# Patient Record
Sex: FEMALE | Race: WHITE | Employment: FULL TIME | URBAN - METROPOLITAN AREA
[De-identification: names, ages, dates, MRNs, and addresses within clinical notes are randomized per-mention and may not be internally consistent; named-entity substitution may affect disease eponyms.]

---

## 2018-07-05 ENCOUNTER — OFFICE VISIT (OUTPATIENT)
Dept: OBGYN CLINIC | Age: 43
End: 2018-07-05

## 2018-07-05 VITALS
BODY MASS INDEX: 32.91 KG/M2 | SYSTOLIC BLOOD PRESSURE: 124 MMHG | WEIGHT: 243 LBS | DIASTOLIC BLOOD PRESSURE: 68 MMHG | HEIGHT: 72 IN

## 2018-07-05 DIAGNOSIS — Z01.419 ENCOUNTER FOR GYNECOLOGICAL EXAMINATION (GENERAL) (ROUTINE) WITHOUT ABNORMAL FINDINGS: Primary | ICD-10-CM

## 2018-07-05 NOTE — PROGRESS NOTES
Annual exam ages 40-58    Javier Andres is a ,  44 y.o. female [facility] Patient's last menstrual period was 2018 (approximate). .    She presents for her annual checkup. She is having no significant problems. With regard to the Gardasil vaccine, she is older than the age for which it is FDA approved. Menstrual status:    Her periods are light, moderate in flow. She is using one to two pads or tampons per day, usually regular and last 26-30 days. She denies dysmenorrhea. She reports no premenstrual symptoms. Contraception:    The current method of family planning is vasectomy. Sexual history:    She  reports that she currently engages in sexual activity and has had male partners. She reports using the following method of birth control/protection: Surgical.    Medical conditions:    Since her last annual GYN exam about three or more years ago, she has not the following changes in her health history: none. Pap and Mammogram History:    Her most recent Pap smear was normal, obtained 5 year(s) ago. The patient has not had a recent mammogram.    Breast Cancer History/Substance Abuse: negative    Osteoporosis History:    Family history does not include a first or second degree relative with osteopenia or osteoporosis. A bone density scan has not been obtained    Past Medical History:   Diagnosis Date    Anxiety     Condyloma     H/O seasonal allergies     Migraines     Trichomonal vulvovaginitis     Uterine polyp      Past Surgical History:   Procedure Laterality Date    HX  SECTION      HX DILATION AND CURETTAGE      HX HYSTEROSCOPY WITH ENDOMETRIAL ABLATION      HX PELVIC LAPAROSCOPY         Current Outpatient Prescriptions   Medication Sig Dispense Refill    HYDROcodone-acetaminophen (LORTAB 7.5) 7.5-500 mg per tablet Take 1 Tab by mouth every four (4) hours as needed for Pain.  16 Tab 0    escitalopram (LEXAPRO) 10 mg tablet Take 10 mg by mouth daily.      clonazePAM (KLONOPIN) 1 mg tablet Take 1 mg by mouth two (2) times daily as needed. Allergies: Wellbutrin [bupropion hcl]     Tobacco History:  reports that she has been smoking. She has a 20.00 pack-year smoking history. She has never used smokeless tobacco.  Alcohol Abuse:  reports that she does not drink alcohol. Drug Abuse:  reports that she does not use illicit drugs.     Family Medical/Cancer History:   Family History   Problem Relation Age of Onset    Nural tube defect Child         Review of Systems - History obtained from the patient  Constitutional: negative for weight loss, fever, night sweats  HEENT: negative for hearing loss, earache, congestion, snoring, sorethroat  CV: negative for chest pain, palpitations, edema  Resp: negative for cough, shortness of breath, wheezing  GI: negative for change in bowel habits, abdominal pain, black or bloody stools  : negative for frequency, dysuria, hematuria, vaginal discharge  MSK: negative for back pain, joint pain, muscle pain  Breast: negative for breast lumps, nipple discharge, galactorrhea  Skin :negative for itching, rash, hives  Neuro: negative for dizziness, headache, confusion, weakness  Psych: negative for anxiety, depression, change in mood  Heme/lymph: negative for bleeding, bruising, pallor    Physical Exam    Visit Vitals    /68    Ht 6' (1.829 m)    Wt 243 lb (110.2 kg)    LMP 06/13/2018 (Approximate)    BMI 32.96 kg/m2       Constitutional  · Appearance: well-nourished, well developed, alert, in no acute distress    HENT  · Head and Face: appears normal    Neck  · Inspection/Palpation: normal appearance, no masses or tenderness  · Lymph Nodes: no lymphadenopathy present  · Thyroid: gland size normal, nontender, no nodules or masses present on palpation    Chest  · Respiratory Effort: breathing unlabored  · Auscultation: normal breath sounds    Cardiovascular  · Heart:  · Auscultation: regular rate and rhythm without murmur    Breasts  · Inspection of Breasts: breasts symmetrical, no skin changes, no discharge present, nipple appearance normal, no skin retraction present  · Palpation of Breasts and Axillae: no masses present on palpation, no breast tenderness  · Axillary Lymph Nodes: no lymphadenopathy present    Gastrointestinal  · Abdominal Examination: abdomen non-tender to palpation, normal bowel sounds, no masses present  · Liver and spleen: no hepatomegaly present, spleen not palpable  · Hernias: no hernias identified    Genitourinary  · External Genitalia: normal appearance for age, no discharge present, no tenderness present, no inflammatory lesions present, no masses present, no atrophy present  · Vagina: normal vaginal vault without central or paravaginal defects, no discharge present, no inflammatory lesions present, no masses present  · Bladder: non-tender to palpation  · Urethra: appears normal  · Cervix: normal   · Uterus: normal size, shape and consistency  · Adnexa: no adnexal tenderness present, no adnexal masses present  · Perineum: perineum within normal limits, no evidence of trauma, no rashes or skin lesions present  · Anus: anus within normal limits, no hemorrhoids present  · Inguinal Lymph Nodes: no lymphadenopathy present    Skin  · General Inspection: no rash, no lesions identified    Neurologic/Psychiatric  · Mental Status:  · Orientation: grossly oriented to person, place and time  · Mood and Affect: mood normal, affect appropriate    Assessment:  Routine gynecologic examination  Her current medical status is satisfactory with no evidence of significant gynecologic issues.     Plan:  Counseled re: diet, exercise, healthy lifestyle  Return for yearly wellness visits  Rec annual mammogram

## 2018-07-05 NOTE — PATIENT INSTRUCTIONS

## 2018-07-09 LAB
CYTOLOGIST CVX/VAG CYTO: NORMAL
CYTOLOGY CVX/VAG DOC THIN PREP: NORMAL
CYTOLOGY HISTORY:: NORMAL
DX ICD CODE: NORMAL
HPV I/H RISK 1 DNA CVX QL PROBE+SIG AMP: NEGATIVE
Lab: NORMAL
OTHER STN SPEC: NORMAL
PATH REPORT.FINAL DX SPEC: NORMAL
STAT OF ADQ CVX/VAG CYTO-IMP: NORMAL

## 2018-08-03 ENCOUNTER — APPOINTMENT (OUTPATIENT)
Dept: OBGYN CLINIC | Age: 43
End: 2018-08-03

## 2018-11-30 ENCOUNTER — OFFICE VISIT (OUTPATIENT)
Dept: CARDIOLOGY CLINIC | Age: 43
End: 2018-11-30

## 2018-11-30 VITALS
HEIGHT: 72 IN | OXYGEN SATURATION: 97 % | WEIGHT: 293 LBS | DIASTOLIC BLOOD PRESSURE: 80 MMHG | SYSTOLIC BLOOD PRESSURE: 130 MMHG | BODY MASS INDEX: 39.68 KG/M2 | HEART RATE: 86 BPM | RESPIRATION RATE: 16 BRPM

## 2018-11-30 DIAGNOSIS — M79.89 LEG SWELLING: Primary | ICD-10-CM

## 2018-11-30 PROBLEM — E66.01 OBESITY, MORBID (HCC): Status: ACTIVE | Noted: 2018-11-30

## 2018-11-30 RX ORDER — FUROSEMIDE 40 MG/1
40 TABLET ORAL DAILY
Qty: 30 TAB | Refills: 0 | Status: SHIPPED | OUTPATIENT
Start: 2018-11-30 | End: 2019-02-02 | Stop reason: SDUPTHER

## 2018-11-30 RX ORDER — FUROSEMIDE 40 MG/1
TABLET ORAL DAILY
COMMUNITY
End: 2018-11-30 | Stop reason: SDUPTHER

## 2018-11-30 NOTE — PROGRESS NOTES
Reason for Consult: LE swelling. HPI: Amanda Haro is a 44 y.o. female with no past medical history is here for evaluation of symptoms of lower extremity edema. Symptoms started about few years ago. Symptoms has been worse for past few months. She has noticed increasing lower extremity edema. She also has noticed increased scaliness and dryness of the lower extremities. The swelling has been constant with some variation. There is no associated shortness of breath or chest pain. He had lost about 80 pounds 2 years ago and had gained most of all of it in the past 1 year. Apparently the swelling has worsened since her weight gain. No significant family history of heart disease. She does not take any medications. She smokes tobacco.    EKG in our office today demonstrate normal sinus rhythm with normal axis with normal ST segment with normal intervals. Plan: Next    1. Lower extremity edema: This is probably multifactorial likely due to weight gain, immobility, venous insufficiency and poor lymphatic drainage. Advised her to increase her mobility, use compression stockings, I will give her Lasix 40 mg p.o. daily for the next few days, also check echocardiogram to make sure the RV function is normal.  Also reduce salt intake.       Past Medical History:   Diagnosis Date    Anxiety     Condyloma     H/O seasonal allergies     Migraines     Trichomonal vulvovaginitis     Uterine polyp             Past Surgical History:   Procedure Laterality Date    HX  SECTION      HX DILATION AND CURETTAGE      HX HYSTEROSCOPY WITH ENDOMETRIAL ABLATION      HX PELVIC LAPAROSCOPY               Family History   Problem Relation Age of Onset    Nural tube defect Child            Social History     Socioeconomic History    Marital status: SINGLE     Spouse name: Not on file    Number of children: Not on file    Years of education: Not on file    Highest education level: Not on file   Social Needs    Financial resource strain: Not on file    Food insecurity - worry: Not on file    Food insecurity - inability: Not on file    Transportation needs - medical: Not on file   [employer] needs - non-medical: Not on file   Occupational History    Not on file   Tobacco Use    Smoking status: Current Every Day Smoker     Packs/day: 1.00     Years: 20.00     Pack years: 20.00    Smokeless tobacco: Never Used   Substance and Sexual Activity    Alcohol use: No    Drug use: No    Sexual activity: Yes     Partners: Male     Birth control/protection: Surgical     Comment: partner with vasectomy   Other Topics Concern    Not on file   Social History Narrative    Not on file         Allergies   Allergen Reactions    Wellbutrin [Bupropion Hcl] Hives            Current Outpatient Medications   Medication Sig Dispense Refill    HYDROcodone-acetaminophen (LORTAB 7.5) 7.5-500 mg per tablet Take 1 Tab by mouth every four (4) hours as needed for Pain. 16 Tab 0    escitalopram (LEXAPRO) 10 mg tablet Take 10 mg by mouth daily.  clonazePAM (KLONOPIN) 1 mg tablet Take 1 mg by mouth two (2) times daily as needed. ROS:  12 point review of systems was performed.  All negative except for HPI     Physical Exam:  Visit Vitals  /80 (BP 1 Location: Left arm, BP Patient Position: Sitting)   Pulse 86   Resp 16   Ht 6' (1.829 m)   Wt 295 lb (133.8 kg)   SpO2 97%   BMI 40.01 kg/m²       Gen:  Well-developed, well-nourished, in no acute distress  HEENT:  Pink conjunctivae, PERRL, hearing intact to voice, moist mucous membranes  Neck:  Supple, without masses, thyroid non-tender  Resp:  No accessory muscle use, clear breath sounds without wheezes rales or rhonchi  Card:  No murmurs, normal S1, S2 without thrills, bruits or peripheral edema  Abd:  Soft, non-tender, non-distended, normoactive bowel sounds are present, no palpable organomegaly and no detectable hernias  Lymph:  No cervical or inguinal adenopathy  Musc:  No cyanosis or clubbing  Skin:  No rashes or ulcers, skin turgor is good  Neuro:  Cranial nerves are grossly intact, no focal motor weakness, follows commands appropriately  Psych:  Good insight, oriented to person, place and time, alert     Labs:     Lab Results   Component Value Date/Time    WBC 7.2 08/05/2013 10:39 AM    HGB 13.1 08/05/2013 10:39 AM    HCT 37.8 08/05/2013 10:39 AM    PLATELET 550 37/16/9150 10:39 AM    MCV 86.3 08/05/2013 10:39 AM     No results found for: HBA1C, GZX0AVWN, HGBE8, GLU, GESTF, GLUCPOC, MCACR, MCA1, MCA2, MCA3, MCAU, LDL, LDLC, DLDLP, BJ, CREAPOC, ACREA, CREA, REFC3, REFC4, NHU1TCSL   No results found for: CHOL, CHOLPOCT, HDL, LDL, LDLC, LDLCPOC, LDLCEXT, TRIGL, TGLPOCT, CHHD, CHHDX  Lab Results   Component Value Date/Time    PLATELET 350 14/29/0475 10:39 AM     No results found for: INR, PTMR, PTP, PT1, PT2   No results found for: GFRNA, GFRNAPOC, GFRAA, GFRAAPOC, CREA, CREAPOC, BUN, IBUN, BUNPOC, NA, NAPOC, K, KPOCT, CL, CLPOC, CO2, CO2POC, MG, PHOS, ALBEU, PTH, PTHILT, EPO  No results found for: PSA, Thor Stalling, PSA3, PSAR3, [id number], [id number], PSALT  No results found for: TSH, TSH2, TSH3, TSHP, TSHELE, TSHEXT, TT3, T3U, T3UP, FRT3, FT3, FT4, FT4P, T4, T4P, FT4T, TT7, TSHEXT   No results found for: GLU, GLUCPOC   No results found for: CPK, RCK1, RCK2, RCK3, RCK4, CKMB, CKNDX, CKND1, TROPT, TROIQ, BNPP, BNP   No results found for: BNP, BNPP, BNPPPOC, XBNPT, BNPNT   No results found for: NA, K, CL, CO2, AGAP, GLU, BUN, CREA, BUCR, GFRAA, GFRNA, CA, GFRAA   No results found for: NA, K, CL, CO2, AGAP, GLU, BUN, CREA, BUCR, GFRAA, GFRNA, CA, TBIL, TBILI, GPT, ALT, SGOT, AP, TP, ALB, GLOB, AGRAT   No results found for: HBA1C, PEU9XHJR, HGBE8, GAM8BNYM, XFQ0AULP      No results for input(s): CPK, CKMB, TROIQ in the last 72 hours.     No lab exists for component: CKQMB, CPKMB        Problem List:     Problem List  Date Reviewed: 11/30/2018 Codes Class Noted    Obesity, morbid ([facility] [address] ICD-10-CM: E66.01  ICD-9-CM: 278.01  11/30/2018        Menorrhagia ICD-10-CM: N92.0  ICD-9-CM: 626.2  8/11/2013        Endometrial polyp ICD-10-CM: N84.0  ICD-9-CM: 621.0  8/11/2013        Dysmenorrhea ICD-10-CM: N94.6  ICD-9-CM: 625.3  8/11/2013                Clark Baldwin MD, [facility]

## 2019-01-11 ENCOUNTER — CLINICAL SUPPORT (OUTPATIENT)
Dept: CARDIOLOGY CLINIC | Age: 44
End: 2019-01-11

## 2019-01-11 ENCOUNTER — OFFICE VISIT (OUTPATIENT)
Dept: CARDIOLOGY CLINIC | Age: 44
End: 2019-01-11

## 2019-01-11 VITALS
DIASTOLIC BLOOD PRESSURE: 72 MMHG | HEIGHT: 72 IN | SYSTOLIC BLOOD PRESSURE: 130 MMHG | OXYGEN SATURATION: 98 % | HEART RATE: 76 BPM | WEIGHT: 293 LBS | BODY MASS INDEX: 39.68 KG/M2

## 2019-01-11 DIAGNOSIS — M79.89 LEG SWELLING: Primary | ICD-10-CM

## 2019-01-11 DIAGNOSIS — M79.89 LOCALIZED SWELLING OF LOWER EXTREMITY: Primary | ICD-10-CM

## 2019-01-11 NOTE — PROGRESS NOTES
Patient has swelling in legs and feet today       Visit Vitals  /72 (BP 1 Location: Left arm, BP Patient Position: Sitting)   Pulse 76   Ht 6' (1.829 m)   Wt 297 lb 12.8 oz (135.1 kg)   SpO2 98%   BMI 40.39 kg/m²

## 2019-01-11 NOTE — PROGRESS NOTES
Office Follow-up    NAME: Vikash Monroe   :  1975  MRM:  [id number]    Date:  2019            Assessment:     Problem List  Date Reviewed: 2019          Codes Class Noted    Obesity, morbid ([facility] ICD-10-CM: E66.01  ICD-9-CM: 278.01  2018        Menorrhagia ICD-10-CM: N92.0  ICD-9-CM: 626.2  2013        Endometrial polyp ICD-10-CM: N84.0  ICD-9-CM: 621.0  2013        Dysmenorrhea ICD-10-CM: N94.6  ICD-9-CM: 625.3  2013                 Plan:     1. Lower extremity edema: We discussed about multifactorial reasons for lower extremity edema. Some of it is weight gain and immobility along with venous insufficiency and poor lymphatics. Continue compression stockings, leg elevation, increase activity, low-salt diet, and weight loss. Use Lasix as needed. 2. See as needed. Subjective:     Vikash Monroe, a 44y.o. year-old who presents for followup. I had seen her back in 2018. She has bilateral lower extremity edema. At that time we decided to start her on Lasix on a daily basis for a few days and also get an echocardiogram.  She used the Lasix for a few days here and there. Swelling improved. She also use compression stockings as well as leg elevation technique and that helped as well. She has not lost or gained any weight in the last 3 months. Echocardiogram has not been performed however she will will get an echocardiogram today in our office.     Exam:     Physical Exam:  Visit Vitals  /72 (BP 1 Location: Left arm, BP Patient Position: Sitting)   Pulse 76   Ht 6' (1.829 m)   Wt 297 lb 12.8 oz (135.1 kg)   SpO2 98%   BMI 40.39 kg/m²     General appearance - alert, well appearing, and in no distress  Mental status - affect appropriate to mood  Eyes - sclera anicteric, moist mucous membranes  Neck - supple, no significant adenopathy  Chest - clear to auscultation, no wheezes, rales or rhonchi  Heart - normal rate, regular rhythm, normal S1, S2, no murmurs, rubs, clicks or gallops  Abdomen - soft, nontender, nondistended, no masses or organomegaly  Extremities - peripheral pulses normal, 2+ pedal edema  Skin - normal coloration  no rashes    Medications:     Current Outpatient Medications   Medication Sig    furosemide (LASIX) 40 mg tablet Take 1 Tab by mouth daily. (Patient taking differently: Take 40 mg by mouth daily as needed.)    HYDROcodone-acetaminophen (LORTAB 7.5) 7.5-500 mg per tablet Take 1 Tab by mouth every four (4) hours as needed for Pain.  escitalopram (LEXAPRO) 10 mg tablet Take 10 mg by mouth daily.  clonazePAM (KLONOPIN) 1 mg tablet Take 1 mg by mouth two (2) times daily as needed. No current facility-administered medications for this visit. Diagnostic Data Review:       No specialty comments available. Lab Review:   No results found for: CHOL, CHOLX, CHLST, CHOLV, 246307, HDL, LDL, LDLC, DLDLP, TGLX, TRIGL, TRIGP, CHHD, CHHDX  No results found for: BJ, CREAPOC, ACREA, CREA, REFC3, REFC4  No results found for: BUN, BUNPOC, IBUN, MBUNV, BUNV  No results found for: K, KI, PLK, WBK  No results found for: HBA1C, HGBE8, TDE6MPQZ  Lab Results   Component Value Date/Time    HGB 13.1 08/05/2013 10:39 AM     Lab Results   Component Value Date/Time    PLATELET 060 77/40/1775 10:39 AM     No results for input(s): CPK, CKMB, TROIQ in the last 72 hours. No lab exists for component: CKQMB, CPKMB             ___________________________________________________    Nacho Helms MD, [facility]

## 2019-01-22 ENCOUNTER — TELEPHONE (OUTPATIENT)
Dept: CARDIOLOGY CLINIC | Age: 44
End: 2019-01-22

## 2019-01-22 NOTE — TELEPHONE ENCOUNTER
Call placed to discuss echo results with pt per VO of Dr. Jason Carlin. Advised of normal results. Continue current medication regimen. Follow up as instructed. Pt expressed understanding.

## 2019-02-04 RX ORDER — FUROSEMIDE 40 MG/1
TABLET ORAL
Qty: 30 TAB | Refills: 0 | Status: SHIPPED | OUTPATIENT
Start: 2019-02-04 | End: 2020-02-28

## 2020-01-31 ENCOUNTER — APPOINTMENT (OUTPATIENT)
Dept: ULTRASOUND IMAGING | Age: 45
End: 2020-01-31
Attending: STUDENT IN AN ORGANIZED HEALTH CARE EDUCATION/TRAINING PROGRAM
Payer: COMMERCIAL

## 2020-01-31 ENCOUNTER — HOSPITAL ENCOUNTER (EMERGENCY)
Age: 45
Discharge: HOME | End: 2020-01-31
Attending: STUDENT IN AN ORGANIZED HEALTH CARE EDUCATION/TRAINING PROGRAM | Admitting: STUDENT IN AN ORGANIZED HEALTH CARE EDUCATION/TRAINING PROGRAM
Payer: COMMERCIAL

## 2020-01-31 VITALS
WEIGHT: 293 LBS | HEART RATE: 72 BPM | BODY MASS INDEX: 41.02 KG/M2 | SYSTOLIC BLOOD PRESSURE: 116 MMHG | TEMPERATURE: 98.1 F | OXYGEN SATURATION: 92 % | RESPIRATION RATE: 11 BRPM | HEIGHT: 71 IN | DIASTOLIC BLOOD PRESSURE: 74 MMHG

## 2020-01-31 DIAGNOSIS — K80.00 CALCULUS OF GALLBLADDER WITH ACUTE CHOLECYSTITIS WITHOUT OBSTRUCTION: Primary | ICD-10-CM

## 2020-01-31 LAB
ALBUMIN SERPL-MCNC: 3.8 G/DL (ref 3.5–5)
ALBUMIN/GLOB SERPL: 0.9 {RATIO} (ref 1.1–2.2)
ALP SERPL-CCNC: 108 U/L (ref 45–117)
ALT SERPL-CCNC: 31 U/L (ref 12–78)
ANION GAP SERPL CALC-SCNC: 6 MMOL/L (ref 5–15)
AST SERPL-CCNC: 33 U/L (ref 15–37)
ATRIAL RATE: 83 BPM
BASOPHILS # BLD: 0.1 K/UL (ref 0–0.1)
BASOPHILS NFR BLD: 1 % (ref 0–1)
BILIRUB SERPL-MCNC: 0.4 MG/DL (ref 0.2–1)
BUN SERPL-MCNC: 10 MG/DL (ref 6–20)
BUN/CREAT SERPL: 15 (ref 12–20)
CALCIUM SERPL-MCNC: 8.9 MG/DL (ref 8.5–10.1)
CALCULATED P AXIS, ECG09: 58 DEGREES
CALCULATED R AXIS, ECG10: 24 DEGREES
CALCULATED T AXIS, ECG11: 11 DEGREES
CHLORIDE SERPL-SCNC: 102 MMOL/L (ref 97–108)
CO2 SERPL-SCNC: 30 MMOL/L (ref 21–32)
COMMENT, HOLDF: NORMAL
CREAT SERPL-MCNC: 0.68 MG/DL (ref 0.55–1.02)
DIAGNOSIS, 93000: NORMAL
DIFFERENTIAL METHOD BLD: NORMAL
EOSINOPHIL # BLD: 0.2 K/UL (ref 0–0.4)
EOSINOPHIL NFR BLD: 2 % (ref 0–7)
ERYTHROCYTE [DISTWIDTH] IN BLOOD BY AUTOMATED COUNT: 12.2 % (ref 11.5–14.5)
GLOBULIN SER CALC-MCNC: 4.2 G/DL (ref 2–4)
GLUCOSE SERPL-MCNC: 103 MG/DL (ref 65–100)
HCT VFR BLD AUTO: 43.6 % (ref 35–47)
HGB BLD-MCNC: 14.5 G/DL (ref 11.5–16)
IMM GRANULOCYTES # BLD AUTO: 0 K/UL (ref 0–0.04)
IMM GRANULOCYTES NFR BLD AUTO: 0 % (ref 0–0.5)
LIPASE SERPL-CCNC: 81 U/L (ref 73–393)
LYMPHOCYTES # BLD: 2.4 K/UL (ref 0.8–3.5)
LYMPHOCYTES NFR BLD: 24 % (ref 12–49)
MCH RBC QN AUTO: 29.7 PG (ref 26–34)
MCHC RBC AUTO-ENTMCNC: 33.3 G/DL (ref 30–36.5)
MCV RBC AUTO: 89.2 FL (ref 80–99)
MONOCYTES # BLD: 0.5 K/UL (ref 0–1)
MONOCYTES NFR BLD: 5 % (ref 5–13)
NEUTS SEG # BLD: 6.6 K/UL (ref 1.8–8)
NEUTS SEG NFR BLD: 68 % (ref 32–75)
NRBC # BLD: 0 K/UL (ref 0–0.01)
NRBC BLD-RTO: 0 PER 100 WBC
P-R INTERVAL, ECG05: 164 MS
PLATELET # BLD AUTO: 267 K/UL (ref 150–400)
PMV BLD AUTO: 9.4 FL (ref 8.9–12.9)
POTASSIUM SERPL-SCNC: 3.6 MMOL/L (ref 3.5–5.1)
PROT SERPL-MCNC: 8 G/DL (ref 6.4–8.2)
Q-T INTERVAL, ECG07: 384 MS
QRS DURATION, ECG06: 90 MS
QTC CALCULATION (BEZET), ECG08: 451 MS
RBC # BLD AUTO: 4.89 M/UL (ref 3.8–5.2)
SAMPLES BEING HELD,HOLD: NORMAL
SODIUM SERPL-SCNC: 138 MMOL/L (ref 136–145)
TROPONIN I SERPL-MCNC: <0.05 NG/ML
VENTRICULAR RATE, ECG03: 83 BPM
WBC # BLD AUTO: 9.7 K/UL (ref 3.6–11)

## 2020-01-31 PROCEDURE — 80053 COMPREHEN METABOLIC PANEL: CPT

## 2020-01-31 PROCEDURE — 84484 ASSAY OF TROPONIN QUANT: CPT

## 2020-01-31 PROCEDURE — 99284 EMERGENCY DEPT VISIT MOD MDM: CPT

## 2020-01-31 PROCEDURE — 83690 ASSAY OF LIPASE: CPT

## 2020-01-31 PROCEDURE — 36415 COLL VENOUS BLD VENIPUNCTURE: CPT

## 2020-01-31 PROCEDURE — 85025 COMPLETE CBC W/AUTO DIFF WBC: CPT

## 2020-01-31 PROCEDURE — 76705 ECHO EXAM OF ABDOMEN: CPT

## 2020-01-31 PROCEDURE — 93005 ELECTROCARDIOGRAM TRACING: CPT

## 2020-01-31 RX ORDER — OXYCODONE AND ACETAMINOPHEN 5; 325 MG/1; MG/1
1 TABLET ORAL
Qty: 12 TAB | Refills: 0 | Status: SHIPPED | OUTPATIENT
Start: 2020-01-31 | End: 2020-02-03

## 2020-01-31 NOTE — ED PROVIDER NOTES
38 y.o. female with past medical history significant for anxiety, condyloma, migraines, and uterine polyp, who presents via POV, with chief complaint of abdominal pain. Pt complains of R upper quadrant abdominal pain that has been present for a few weeks and intermittently worsens. She notes the pain has now been constantly worse since yesterday, prompting her to come to ED. She reports her pain is worse with deep inspiration, and worse after eating greasy food. She denies hx of cholecystectomy and DVTs. She endorses hx of leg selling caused by a vascular issue. She denies possibility of pregnancy, but her menstrual cycle is irregular due to hx of ablation. There are no other acute medical concerns at this time. Social hx: endorses tobacco smoking (1 pack per day); denies EtOH use; denies illicit drug use. PCP: Liz Flores MD      Note written by Yancy James, as dictated by Fatou Fernandes MD 10:36 AM      The history is provided by the patient. No  was used.         Past Medical History:   Diagnosis Date    Anxiety     Condyloma     H/O seasonal allergies     Migraines     Trichomonal vulvovaginitis     Uterine polyp        Past Surgical History:   Procedure Laterality Date    HX  SECTION  2006    HX DILATION AND CURETTAGE      HX HYSTEROSCOPY WITH ENDOMETRIAL ABLATION      HX PELVIC LAPAROSCOPY           Family History:   Problem Relation Age of Onset    Nural tube defect Child        Social History     Socioeconomic History    Marital status: SINGLE     Spouse name: Not on file    Number of children: Not on file    Years of education: Not on file    Highest education level: Not on file   Occupational History    Not on file   Social Needs    Financial resource strain: Not on file    Food insecurity:     Worry: Not on file     Inability: Not on file    Transportation needs:     Medical: Not on file     Non-medical: Not on file   Tobacco Use    Smoking status: Current Every Day Smoker     Packs/day: 1.00     Years: 20.00     Pack years: 20.00    Smokeless tobacco: Never Used   Substance and Sexual Activity    Alcohol use: No    Drug use: No    Sexual activity: Yes     Partners: Male     Birth control/protection: Surgical     Comment: partner with vasectomy   Lifestyle    Physical activity:     Days per week: Not on file     Minutes per session: Not on file    Stress: Not on file   Relationships    Social connections:     Talks on phone: Not on file     Gets together: Not on file     Attends Orthodox service: Not on file     Active member of club or organization: Not on file     Attends meetings of clubs or organizations: Not on file     Relationship status: Not on file    Intimate partner violence:     Fear of current or ex partner: Not on file     Emotionally abused: Not on file     Physically abused: Not on file     Forced sexual activity: Not on file   Other Topics Concern    Not on file   Social History Narrative    Not on file         ALLERGIES: Wellbutrin [bupropion hcl]    Review of Systems   Constitutional: Negative for activity change, diaphoresis, fatigue and fever. HENT: Negative for congestion and sore throat. Eyes: Negative for photophobia and visual disturbance. Respiratory: Negative for chest tightness and shortness of breath. Cardiovascular: Negative for chest pain, palpitations and leg swelling. Gastrointestinal: Positive for abdominal pain. Negative for blood in stool, constipation, diarrhea, nausea and vomiting. Genitourinary: Negative for difficulty urinating, dysuria, flank pain, frequency and hematuria. Musculoskeletal: Negative for back pain. Neurological: Negative for dizziness, syncope, numbness and headaches. All other systems reviewed and are negative.       Vitals:    01/31/20 1011   BP: 137/83   Pulse: 92   Resp: 22   Temp: 98.1 °F (36.7 °C)   SpO2: 96%   Weight: 136.1 kg (300 lb)   Height: 5' 11\" (1.803 m)            Physical Exam  Vitals signs and nursing note reviewed. Constitutional:       General: She is not in acute distress. Appearance: She is well-developed. She is not diaphoretic. HENT:      Head: Normocephalic and atraumatic. Nose: Nose normal.      Mouth/Throat:      Pharynx: No oropharyngeal exudate. Eyes:      General: No scleral icterus. Right eye: No discharge. Left eye: No discharge. Conjunctiva/sclera: Conjunctivae normal.   Neck:      Musculoskeletal: Normal range of motion and neck supple. Thyroid: No thyromegaly. Vascular: No JVD. Trachea: No tracheal deviation. Cardiovascular:      Rate and Rhythm: Normal rate and regular rhythm. Heart sounds: Normal heart sounds. No murmur. No friction rub. No gallop. Pulmonary:      Effort: Pulmonary effort is normal. No respiratory distress. Breath sounds: Normal breath sounds. No stridor. No wheezing or rales. Chest:      Chest wall: No tenderness. Abdominal:      General: Bowel sounds are normal. There is no distension. Palpations: There is no mass. Tenderness: There is abdominal tenderness in the right upper quadrant. There is no rebound. Musculoskeletal: Normal range of motion. General: No tenderness. Lymphadenopathy:      Cervical: No cervical adenopathy. Skin:     General: Skin is warm and dry. Coloration: Skin is not pale. Findings: No erythema or rash. Neurological:      Mental Status: She is alert and oriented to person, place, and time. Cranial Nerves: No cranial nerve deficit. Coordination: Coordination normal.   Psychiatric:         Behavior: Behavior normal.         Thought Content:  Thought content normal.         Judgment: Judgment normal.      Note written by Yancy Jerez, as dictated by Imelda Emmanuel MD 10:41 AM    MDM  Number of Diagnoses or Management Options  Calculus of gallbladder with acute cholecystitis without obstruction:   Diagnosis management comments: A/P: Cholelithiasis. 52-year-old female presenting with right upper quadrant abdominal pain concerning for cholelithiasis. Bedside ultrasound shows large stone no evidence of cholecystitis. Will order formal ultrasound. Amount and/or Complexity of Data Reviewed  Clinical lab tests: ordered and reviewed  Tests in the radiology section of CPT®: reviewed and ordered  Review and summarize past medical records: yes  Independent visualization of images, tracings, or specimens: yes    Risk of Complications, Morbidity, and/or Mortality  Presenting problems: moderate  Diagnostic procedures: moderate  Management options: moderate    Patient Progress  Patient progress: stable         Bedside US  Date/Time: 2/1/2020 9:19 PM  Performed by: Donita Dill MD  Authorized by: Donita Dill MD     Verbal consent obtained: Yes    Given by:  Patient  Performed by: Attending  Type of procedure: Focused biliary  Indications:  Abdominal pain  Gallbladder long axis:  Adequate  Gallbladder short axis:  Adequate  Gallstone(s):  Present  Sonographic Laos sign:  Indeterminate  Interpretation:  Cholelithiasis without sonographic evidence of cholecystitis  Left eye:     Confirmation study:  A confirmatory study was obtained while the patient was in the [facility] Department. ED EKG interpretation: 10:14 AM  Rhythm: normal sinus rhythm; and regular . Rate (approx.): 83; Axis: normal; ST/T wave: no ST or T wave abnormalitites; Note written by Yancy Kapoor, as dictated by Donita Dill MD 12:48 PM    12:47 PM  Discussed with pt about cholelithiasis diagnosis. Advised out patient follow up with general surgery. 12:48 PM  Patient's results have been reviewed with them.   Patient and/or family have verbally conveyed their understanding and agreement of the patient's signs, symptoms, diagnosis, treatment and prognosis and additionally agree to follow up as recommended or return to the Emergency Room should their condition change prior to follow-up. Discharge instructions have also been provided to the patient with some educational information regarding their diagnosis as well a list of reasons why they would want to return to the ER prior to their follow-up appointment should their condition change.

## 2020-01-31 NOTE — ED NOTES
Patient given discharge instructions per provider and one prescription. Patient verbalized understanding and ambulatory from ED to home.

## 2020-01-31 NOTE — DISCHARGE INSTRUCTIONS
Patient Education        Low-Fat Diet for Gallbladder Disease: Care Instructions  Your Care Instructions    When you eat, the gallbladder releases bile, which helps you digest the fat in food. If you have an inflamed gallbladder, this may cause pain. A low-fat diet may give your gallbladder a rest so you can start to heal. Your doctor and dietitian can help you make an eating plan that does not irritate your digestive system. Always talk with your doctor or dietitian before you make changes in your diet. Follow-up care is a key part of your treatment and safety. Be sure to make and go to all appointments, and call your doctor if you are having problems. It's also a good idea to know your test results and keep a list of the medicines you take. How can you care for yourself at home? · Eat many small meals and snacks each day instead of three large meals. · Choose lean meats. ? Eat no more than 5 to 6½ ounces of meat a day. ? Cut off all fat you can see. ? Eat chicken and turkey without the skin. ? Many types of fish, such as salmon, lake trout, tuna, and herring, provide healthy omega-3 fat. But, avoid fish canned in oil, such as sardines in olive oil. ? Bake, broil, or grill meats, poultry, or fish instead of frying them in butter or fat. · Drink or eat nonfat or low-fat milk, yogurt, cheese, or other milk products each day. ? Read the labels on cheeses, and choose those with less than 5 grams of fat an ounce. ? Try fat-free sour cream, cream cheese, or yogurt. ? Avoid cream soups and cream sauces on pasta. ? Eat low-fat ice cream, frozen yogurt, or sorbet. Avoid regular ice cream.  · Eat whole-grain cereals, breads, crackers, rice, or pasta. Avoid high-fat foods such as croissants, scones, biscuits, waffles, doughnuts, muffins, granola, and high-fat breads. · Flavor your foods with herbs and spices (such as basil, tarragon, or mint), fat-free sauces, or lemon juice instead of butter.  You can also use butter substitutes, fat-free mayonnaise, or fat-free dressing. · Try applesauce, prune puree, or mashed bananas to replace some or all of the fat when you bake. · Limit fats and oils, such as butter, margarine, mayonnaise, and salad dressing, to no more than 1 tablespoon a meal.  · Avoid high-fat foods, such as:  ? Chocolate, whole milk, ice cream, and processed cheese. ? Fried or buttered foods. ? Sausage, salami, and reed. ? Cinnamon rolls, cakes, pies, cookies, and other pastries. ? Prepared snack foods, such as potato chips, nut and granola bars, and mixed nuts. ? Coconut and avocado. · Learn how to read food labels for serving sizes and ingredients. Fast-food and convenience-food meals often have lots of fat. Where can you learn more? Go to http://tammy-carolee.info/. Enter K004 in the search box to learn more about \"Low-Fat Diet for Gallbladder Disease: Care Instructions. \"  Current as of: November 7, 2018  Content Version: 12.2  © 6020-3522 [employer].. Care instructions adapted under license by [employer] (which disclaims liability or warranty for this information). If you have questions about a medical condition or this instruction, always ask your healthcare professional. [place] [address] any warranty or liability for your use of this information. Patient Education        Patient Education        Learning About Gallstones  What are gallstones? Gallstones are stones that form in the gallbladder. The gallbladder is a small sac located just under the liver. It stores bile released by the liver. Bile helps you digest fats. Gallstones can be smaller than a grain of sand or as large as a golf ball. Gallstones form when cholesterol and other things found in bile make stones. They can also form if the gallbladder doesn't empty as it should. Gallstones can also form in the common bile duct or cystic duct.  These tubes carry bile from the gallbladder and the liver to the small intestine. What happens when you have gallstones? Gallstones can cause many different problems, such as:  · A blockage in the common bile duct. · Inflammation or infection of the gallbladder (acute cholecystitis). This can happen when a gallstone blocks the cystic duct. · Inflammation or infection of the common bile duct (cholangitis). This can happen when gallstones get stuck in the common bile duct. In rare cases, this can damage the liver or spread infection. · Pancreatitis, an inflammation of the pancreas. What are the symptoms? · Most people who have gallstones don't have symptoms. · When symptoms occur, they can include:  ? Pain in the pit of your stomach or in the upper right part of your belly. It may spread to your right upper back or shoulder blade area. ? Pain that may come and go or be steady. It may get worse when you eat. ? Fever and chills, if a gallstone is blocking a bile duct and causing an infection. ? Yellowing of your skin and the whites of your eyes. How can you prevent gallstones? There is no sure way to prevent gallstones. But you can reduce your risk of forming gallstones that can cause symptoms. · Stay at a healthy weight. If you need to lose weight, do so slowly and sensibly. · Eat regular, balanced meals. · Be active, and exercise regularly. How are gallstones treated? · If you don't have symptoms, you probably don't need treatment. · For mild symptoms, your doctor may have you take pain medicine and wait to see if the pain goes away. · For severe pain or infection, or if you have more than one gallstone attack, your doctor may suggest surgery to have your gallbladder removed. The body works fine without a gallbladder. Follow-up care is a key part of your treatment and safety. Be sure to make and go to all appointments, and call your doctor if you are having problems.  It's also a good idea to know your test results and keep a list of the medicines you take. Where can you learn more? Go to http://tammy-carolee.info/. Enter  in the search box to learn more about \"Learning About Gallstones. \"  Current as of: November 7, 2018  Content Version: 12.2  © 4765-3675 [employer]. Care instructions adapted under license by [facility] (which disclaims liability or warranty for this information). If you have questions about a medical condition or this instruction, always ask your healthcare professional. [address] any warranty or liability for your use of this information.

## 2020-02-28 ENCOUNTER — HOSPITAL ENCOUNTER (OUTPATIENT)
Dept: PREADMISSION TESTING | Age: 45
Discharge: HOME | End: 2020-02-28
Payer: COMMERCIAL

## 2020-02-28 VITALS
SYSTOLIC BLOOD PRESSURE: 146 MMHG | BODY MASS INDEX: 41.02 KG/M2 | HEIGHT: 71 IN | OXYGEN SATURATION: 95 % | DIASTOLIC BLOOD PRESSURE: 75 MMHG | HEART RATE: 102 BPM | RESPIRATION RATE: 18 BRPM | WEIGHT: 293 LBS | TEMPERATURE: 98.6 F

## 2020-02-28 LAB
ANION GAP SERPL CALC-SCNC: 7 MMOL/L (ref 5–15)
BUN SERPL-MCNC: 9 MG/DL (ref 6–20)
BUN/CREAT SERPL: 16 (ref 12–20)
CALCIUM SERPL-MCNC: 9 MG/DL (ref 8.5–10.1)
CHLORIDE SERPL-SCNC: 101 MMOL/L (ref 97–108)
CO2 SERPL-SCNC: 27 MMOL/L (ref 21–32)
CREAT SERPL-MCNC: 0.56 MG/DL (ref 0.55–1.02)
GLUCOSE SERPL-MCNC: 93 MG/DL (ref 65–100)
POTASSIUM SERPL-SCNC: 4.2 MMOL/L (ref 3.5–5.1)
SODIUM SERPL-SCNC: 135 MMOL/L (ref 136–145)

## 2020-02-28 PROCEDURE — 36415 COLL VENOUS BLD VENIPUNCTURE: CPT

## 2020-02-28 PROCEDURE — 80048 BASIC METABOLIC PNL TOTAL CA: CPT

## 2020-02-28 NOTE — PERIOP NOTES
[address] [address] [facility]   MAIN OR                                  [phone]   MAIN PRE OP                          [phone]                                                                                AMBULATORY PRE OP          [phone]  PRE-ADMISSION TESTING    [phone]   Surgery Date:   Friday, March 6, 2020         Is surgery arrival time given by surgeon? NO  If NO, [facility] staff will call you between 3 and 7pm the day before your surgery with your arrival time. (If your surgery is on a Monday, we will call you the Friday before.)    Call [phone] after 7pm Monday-Friday if you did not receive this call. INSTRUCTIONS BEFORE YOUR SURGERY   When You  Arrive Arrive at the [address] [facility] on the day of your surgery  Have your insurance card, photo ID, and any copayment (if needed)   Food   and   Drink NO food or drink after midnight the night before surgery    This means NO water, gum, mints, coffee, juice, etc.  No alcohol (beer, wine, liquor) 24 hours before and after surgery   Medications to   TAKE   Morning of Surgery MEDICATIONS TO TAKE THE MORNING OF SURGERY WITH A SIP OF WATER:    none   Medications  To  STOP      7 days before surgery  Non-Steroidal anti-inflammatory Drugs (NSAID's): for example, Ibuprofen (Advil, Motrin), Naproxen (Aleve)   Aspirin, if taking for pain    Herbal supplements, vitamins, and fish oil   Other: Cinnamon  (Pain medications not listed above, including Tylenol may be taken)   Blood  Thinners  If you take  Aspirin, Plavix, Coumadin, or any blood-thinning or anti-blood clot medicine, talk to the doctor who prescribed the medications for pre-operative instructions.    Bathing Clothing  Jewelry  Valuables      If you shower the morning of surgery, please do not apply anything to your skin (lotions, powders, deodorant, or makeup, especially mascara)   Follow Chlorhex[employer] body wash instructions provided to you during PAT appointment. Begin 3 days prior to surgery.  Do not shave or trim anywhere 24 hours before surgery   Wear your hair loose or down; no pony-tails, buns, or metal hair clips   Wear loose, comfortable, clean clothes   Wear glasses instead of contacts   Leave money, valuables, and jewelry, including body piercings, at home   Going Home - or Spending the Night  SAME-DAY SURGERY: You must have a responsible adult drive you home and stay with you 24 hours after surgery   ADMITS: If your doctor is keeping you in the hospital after surgery, leave personal belongings/luggage in your car until you have a hospital room number. Hospital discharge time is 12 noon  Drivers must be here before 12 noon unless you are told differently   Special Instructions Free  parking available from 7am until 5pm.     Follow all instructions so your surgery wont be cancelled. Please, be on time. If a situation occurs and you are delayed the day of surgery, call [phone]. If your physical condition changes (like a fever, cold, flu, etc.) call your surgeon. Home medication(s) reviewed and verified verbally with patient during PAT appointment. The patient was contacted  in person. The patient verbalizes understanding of all instructions and does not need reinforcement.

## 2020-03-05 ENCOUNTER — ANESTHESIA EVENT (OUTPATIENT)
Dept: SURGERY | Age: 45
End: 2020-03-05
Payer: COMMERCIAL

## 2020-03-05 NOTE — PERIOP NOTES
Spoke to Goran and Lola at Dr. Root Meter office requesting the H&P for surgery. Per Olivia, Dr. Erika Ceron has not completed the H&P as of yet. If he does not complete it, he will do a full H&P on the day of surgery.   DOS: 3/6/2020

## 2020-03-06 ENCOUNTER — HOSPITAL ENCOUNTER (OUTPATIENT)
Age: 45
Setting detail: OUTPATIENT SURGERY
Discharge: HOME | End: 2020-03-06
Attending: SURGERY | Admitting: SURGERY
Payer: COMMERCIAL

## 2020-03-06 ENCOUNTER — ANESTHESIA (OUTPATIENT)
Dept: SURGERY | Age: 45
End: 2020-03-06
Payer: COMMERCIAL

## 2020-03-06 VITALS
DIASTOLIC BLOOD PRESSURE: 62 MMHG | HEIGHT: 71 IN | RESPIRATION RATE: 15 BRPM | WEIGHT: 293 LBS | HEART RATE: 78 BPM | SYSTOLIC BLOOD PRESSURE: 116 MMHG | OXYGEN SATURATION: 98 % | BODY MASS INDEX: 41.02 KG/M2 | TEMPERATURE: 98 F

## 2020-03-06 DIAGNOSIS — K80.20 SYMPTOMATIC CHOLELITHIASIS: Primary | ICD-10-CM

## 2020-03-06 LAB — HCG UR QL: NEGATIVE

## 2020-03-06 PROCEDURE — 77030009851 HC PCH RTVR ENDOSC AMR -B: Performed by: SURGERY

## 2020-03-06 PROCEDURE — 88304 TISSUE EXAM BY PATHOLOGIST: CPT

## 2020-03-06 PROCEDURE — 76060000033 HC ANESTHESIA 1 TO 1.5 HR: Performed by: SURGERY

## 2020-03-06 PROCEDURE — 74011250636 HC RX REV CODE- 250/636: Performed by: NURSE ANESTHETIST, CERTIFIED REGISTERED

## 2020-03-06 PROCEDURE — 76010000149 HC OR TIME 1 TO 1.5 HR: Performed by: SURGERY

## 2020-03-06 PROCEDURE — 77030018875 HC APPL CLP LIG4 J&J -B: Performed by: SURGERY

## 2020-03-06 PROCEDURE — 77030012770 HC TRCR OPT FX AMR -B: Performed by: SURGERY

## 2020-03-06 PROCEDURE — 81025 URINE PREGNANCY TEST: CPT

## 2020-03-06 PROCEDURE — 74011250636 HC RX REV CODE- 250/636: Performed by: SURGERY

## 2020-03-06 PROCEDURE — C9290 INJ, BUPIVACAINE LIPOSOME: HCPCS | Performed by: SURGERY

## 2020-03-06 PROCEDURE — 76210000020 HC REC RM PH II FIRST 0.5 HR: Performed by: SURGERY

## 2020-03-06 PROCEDURE — 77030020829: Performed by: SURGERY

## 2020-03-06 PROCEDURE — 74011250636 HC RX REV CODE- 250/636: Performed by: ANESTHESIOLOGY

## 2020-03-06 PROCEDURE — 77030026438 HC STYL ET INTUB CARD -A: Performed by: ANESTHESIOLOGY

## 2020-03-06 PROCEDURE — 77030002933 HC SUT MCRYL J&J -A: Performed by: SURGERY

## 2020-03-06 PROCEDURE — 77030011640 HC PAD GRND REM COVD -A: Performed by: SURGERY

## 2020-03-06 PROCEDURE — 77030008756 HC TU IRR SUC STRY -B: Performed by: SURGERY

## 2020-03-06 PROCEDURE — 74011000250 HC RX REV CODE- 250: Performed by: NURSE ANESTHETIST, CERTIFIED REGISTERED

## 2020-03-06 PROCEDURE — 77030040922 HC BLNKT HYPOTHRM STRY -A

## 2020-03-06 PROCEDURE — 77030040361 HC SLV COMPR DVT MDII -B

## 2020-03-06 PROCEDURE — 77030008608 HC TRCR ENDOSC SMTH AMR -B: Performed by: SURGERY

## 2020-03-06 PROCEDURE — 77030037032 HC INSRT SCIS CLICKLLINE DISP STOR -B: Performed by: SURGERY

## 2020-03-06 PROCEDURE — 76210000006 HC OR PH I REC 0.5 TO 1 HR: Performed by: SURGERY

## 2020-03-06 PROCEDURE — 77030008684 HC TU ET CUF COVD -B: Performed by: ANESTHESIOLOGY

## 2020-03-06 PROCEDURE — 77030020747 HC TU INSUF ENDOSC TELE -A: Performed by: SURGERY

## 2020-03-06 PROCEDURE — 74011000250 HC RX REV CODE- 250: Performed by: SURGERY

## 2020-03-06 RX ORDER — LIDOCAINE HYDROCHLORIDE 10 MG/ML
0.1 INJECTION, SOLUTION EPIDURAL; INFILTRATION; INTRACAUDAL; PERINEURAL AS NEEDED
Status: DISCONTINUED | OUTPATIENT
Start: 2020-03-06 | End: 2020-03-06 | Stop reason: HOSPADM

## 2020-03-06 RX ORDER — MIDAZOLAM HYDROCHLORIDE 1 MG/ML
INJECTION, SOLUTION INTRAMUSCULAR; INTRAVENOUS AS NEEDED
Status: DISCONTINUED | OUTPATIENT
Start: 2020-03-06 | End: 2020-03-06 | Stop reason: HOSPADM

## 2020-03-06 RX ORDER — HYDROMORPHONE HYDROCHLORIDE 1 MG/ML
.5-1 INJECTION, SOLUTION INTRAMUSCULAR; INTRAVENOUS; SUBCUTANEOUS
Status: DISCONTINUED | OUTPATIENT
Start: 2020-03-06 | End: 2020-03-06 | Stop reason: HOSPADM

## 2020-03-06 RX ORDER — OXYCODONE HYDROCHLORIDE 5 MG/1
5 TABLET ORAL
Qty: 10 TAB | Refills: 0 | Status: SHIPPED | OUTPATIENT
Start: 2020-03-06 | End: 2020-03-09

## 2020-03-06 RX ORDER — SODIUM CHLORIDE, SODIUM LACTATE, POTASSIUM CHLORIDE, CALCIUM CHLORIDE 600; 310; 30; 20 MG/100ML; MG/100ML; MG/100ML; MG/100ML
125 INJECTION, SOLUTION INTRAVENOUS CONTINUOUS
Status: DISCONTINUED | OUTPATIENT
Start: 2020-03-06 | End: 2020-03-06 | Stop reason: HOSPADM

## 2020-03-06 RX ORDER — DEXAMETHASONE SODIUM PHOSPHATE 4 MG/ML
INJECTION, SOLUTION INTRA-ARTICULAR; INTRALESIONAL; INTRAMUSCULAR; INTRAVENOUS; SOFT TISSUE AS NEEDED
Status: DISCONTINUED | OUTPATIENT
Start: 2020-03-06 | End: 2020-03-06 | Stop reason: HOSPADM

## 2020-03-06 RX ORDER — ONDANSETRON 4 MG/1
4 TABLET, ORALLY DISINTEGRATING ORAL
Qty: 12 TAB | Refills: 1 | Status: SHIPPED | OUTPATIENT
Start: 2020-03-06 | End: 2021-06-03

## 2020-03-06 RX ORDER — ROCURONIUM BROMIDE 10 MG/ML
INJECTION, SOLUTION INTRAVENOUS AS NEEDED
Status: DISCONTINUED | OUTPATIENT
Start: 2020-03-06 | End: 2020-03-06

## 2020-03-06 RX ORDER — ONDANSETRON 2 MG/ML
INJECTION INTRAMUSCULAR; INTRAVENOUS AS NEEDED
Status: DISCONTINUED | OUTPATIENT
Start: 2020-03-06 | End: 2020-03-06 | Stop reason: HOSPADM

## 2020-03-06 RX ORDER — CHOLECALCIFEROL (VITAMIN D3) 125 MCG
CAPSULE ORAL
COMMUNITY
End: 2021-06-03

## 2020-03-06 RX ORDER — KETOROLAC TROMETHAMINE 30 MG/ML
INJECTION, SOLUTION INTRAMUSCULAR; INTRAVENOUS AS NEEDED
Status: DISCONTINUED | OUTPATIENT
Start: 2020-03-06 | End: 2020-03-06 | Stop reason: HOSPADM

## 2020-03-06 RX ORDER — LIDOCAINE HYDROCHLORIDE 20 MG/ML
INJECTION, SOLUTION EPIDURAL; INFILTRATION; INTRACAUDAL; PERINEURAL AS NEEDED
Status: DISCONTINUED | OUTPATIENT
Start: 2020-03-06 | End: 2020-03-06 | Stop reason: HOSPADM

## 2020-03-06 RX ORDER — GLYCOPYRROLATE 0.2 MG/ML
INJECTION INTRAMUSCULAR; INTRAVENOUS AS NEEDED
Status: DISCONTINUED | OUTPATIENT
Start: 2020-03-06 | End: 2020-03-06 | Stop reason: HOSPADM

## 2020-03-06 RX ORDER — POLYETHYLENE GLYCOL 3350 17 G/17G
17 POWDER, FOR SOLUTION ORAL DAILY
Qty: 238 G | Refills: 0 | Status: SHIPPED | OUTPATIENT
Start: 2020-03-06 | End: 2021-06-03

## 2020-03-06 RX ORDER — NEOSTIGMINE METHYLSULFATE 1 MG/ML
INJECTION, SOLUTION INTRAVENOUS AS NEEDED
Status: DISCONTINUED | OUTPATIENT
Start: 2020-03-06 | End: 2020-03-06 | Stop reason: HOSPADM

## 2020-03-06 RX ORDER — HYDROMORPHONE HYDROCHLORIDE 2 MG/ML
INJECTION, SOLUTION INTRAMUSCULAR; INTRAVENOUS; SUBCUTANEOUS AS NEEDED
Status: DISCONTINUED | OUTPATIENT
Start: 2020-03-06 | End: 2020-03-06 | Stop reason: HOSPADM

## 2020-03-06 RX ORDER — PROPOFOL 10 MG/ML
INJECTION, EMULSION INTRAVENOUS AS NEEDED
Status: DISCONTINUED | OUTPATIENT
Start: 2020-03-06 | End: 2020-03-06 | Stop reason: HOSPADM

## 2020-03-06 RX ORDER — ONDANSETRON 2 MG/ML
4 INJECTION INTRAMUSCULAR; INTRAVENOUS AS NEEDED
Status: DISCONTINUED | OUTPATIENT
Start: 2020-03-06 | End: 2020-03-06 | Stop reason: HOSPADM

## 2020-03-06 RX ORDER — ROCURONIUM BROMIDE 10 MG/ML
INJECTION, SOLUTION INTRAVENOUS AS NEEDED
Status: DISCONTINUED | OUTPATIENT
Start: 2020-03-06 | End: 2020-03-06 | Stop reason: HOSPADM

## 2020-03-06 RX ORDER — FENTANYL CITRATE 50 UG/ML
INJECTION, SOLUTION INTRAMUSCULAR; INTRAVENOUS AS NEEDED
Status: DISCONTINUED | OUTPATIENT
Start: 2020-03-06 | End: 2020-03-06 | Stop reason: HOSPADM

## 2020-03-06 RX ADMIN — MIDAZOLAM HYDROCHLORIDE 2 MG: 2 INJECTION, SOLUTION INTRAMUSCULAR; INTRAVENOUS at 12:35

## 2020-03-06 RX ADMIN — SODIUM CHLORIDE, SODIUM LACTATE, POTASSIUM CHLORIDE, AND CALCIUM CHLORIDE 125 ML/HR: 600; 310; 30; 20 INJECTION, SOLUTION INTRAVENOUS at 10:51

## 2020-03-06 RX ADMIN — CEFAZOLIN 3 G: 1 INJECTION, POWDER, FOR SOLUTION INTRAMUSCULAR; INTRAVENOUS; PARENTERAL at 12:57

## 2020-03-06 RX ADMIN — HYDROMORPHONE HYDROCHLORIDE 1 MG: 2 INJECTION INTRAMUSCULAR; INTRAVENOUS; SUBCUTANEOUS at 13:35

## 2020-03-06 RX ADMIN — Medication 3 MG: at 13:22

## 2020-03-06 RX ADMIN — ROCURONIUM BROMIDE 10 MG: 50 INJECTION, SOLUTION INTRAVENOUS at 12:44

## 2020-03-06 RX ADMIN — PROPOFOL 200 MG: 10 INJECTION, EMULSION INTRAVENOUS at 12:44

## 2020-03-06 RX ADMIN — FENTANYL CITRATE 100 MCG: 50 INJECTION, SOLUTION INTRAMUSCULAR; INTRAVENOUS at 12:44

## 2020-03-06 RX ADMIN — KETOROLAC TROMETHAMINE 30 MG: 30 INJECTION INTRAMUSCULAR; INTRAVENOUS at 13:22

## 2020-03-06 RX ADMIN — LIDOCAINE HYDROCHLORIDE 100 MG: 20 INJECTION, SOLUTION INTRAVENOUS at 12:44

## 2020-03-06 RX ADMIN — HYDROMORPHONE HYDROCHLORIDE 1 MG: 2 INJECTION INTRAMUSCULAR; INTRAVENOUS; SUBCUTANEOUS at 13:12

## 2020-03-06 RX ADMIN — ROCURONIUM BROMIDE 30 MG: 50 INJECTION, SOLUTION INTRAVENOUS at 12:47

## 2020-03-06 RX ADMIN — HYDROMORPHONE HYDROCHLORIDE 1 MG: 1 INJECTION, SOLUTION INTRAMUSCULAR; INTRAVENOUS; SUBCUTANEOUS at 13:51

## 2020-03-06 RX ADMIN — PROPOFOL 200 MG: 10 INJECTION, EMULSION INTRAVENOUS at 12:46

## 2020-03-06 RX ADMIN — DEXAMETHASONE SODIUM PHOSPHATE 8 MG: 4 INJECTION, SOLUTION INTRAMUSCULAR; INTRAVENOUS at 12:55

## 2020-03-06 RX ADMIN — GLYCOPYRROLATE 0.6 MG: 0.2 INJECTION, SOLUTION INTRAMUSCULAR; INTRAVENOUS at 13:22

## 2020-03-06 RX ADMIN — ONDANSETRON 4 MG: 2 INJECTION INTRAMUSCULAR; INTRAVENOUS at 13:22

## 2020-03-06 NOTE — OP NOTES
OPERATIVE NOTE    Date of Procedure: 3/6/2020   Preoperative Diagnosis: Cholelithiasis  Postoperative Diagnosis: Same  Procedure(s):  LAPAROSCOPIC CHOLECYSTECTOMY  Surgeon(s) and Role:     Shanta Mcgregor MD - Primary         Surgical Assistant: Chon De Guzman    Surgical Staff:  Circ-1: Jenni Chu RN  Scrub Tech-1: Gracia Hdez  Surg Asst-1: Eugene Gomez  Event Time In Time Out   Incision Start 1305    Incision Close 1333      Anesthesia: General   Estimated Blood Loss: Min  Specimens:   ID Type Source Tests Collected by Time Destination   1 : gallbladder Preservative Gallbladder  Ronen Saba MD 3/6/2020 1316 Pathology      Findings: Thickened gallbladder wall with gallstones   Complications: none  Implants: * No implants in log *     Indication:  See paper H/P. Procedure:  After standard pre-anesthetic and pre-operative procedure nursing protocols, general anesthesia instituted. Wth the patient supine on the operating table, the abdomen was cleaned, prepped, and draped in usual sterile fashion. The laparoscopic camera and CO2 insufflation apparatus were affixed to the drapes in the usual fashion. Pre-incision antibiotics were given 30 minutes prior to skin incision. Pre-incision liposomal bupivicaine and 0.5% plain marcaine anesthetic was injected in the usual port sites of the skin. Through a 5mm horizontal right para-umbilical skin incision, the abdomen was entered under direct camera vision with a 5 mm blunt tissue dissecting port. Insufflation was established satisfactorily and maintained at 15mm. The laparoscopic camera was re-introduced and confirmed no gross evidence of intraabdominal visceral injury or bleeding in attaining access. Final midline horizontal port incisions were made, and under direct laparoscopic vision 5 mm and 12mm ventral ports were introduced. The patient was positioned in reverse Trendelenburg with left tilt.  The fundus was grasped and lifted up towards the diaphragm. The infundibulum was retracted laterally and inferiorly. There was thickened peritoneum here and required meticulous dissection in order to completely free the infundibulum and cystic duct. The junction of the cystic duct and gallbladder were clearly identified, and an adequate length of the cystic duct was dissected out. Similarly, the cystic artery was also dissected out and an adequate length was displayed. The critical view of Calot's triangle was obtained and the relavant structures clearly identified. The cystic duct was clipped high on the infundibulum as possible. Proximal cystic duct was clipped three times and divided with laparoscopic scissors. The cystic artery was clipped twice on the stay side and divided with laparoscopic scissors. With electrocautery, the gallbladder was gently dissected completely from the liver bed and placed in a retrieval bag. Hemostasis was satisfactory. The 5mm ports were removed under direct laparoscopic vision with no concern for preperitoneal or rectus bleeding. The remaining local anesthetic was injected in the pre-peritoneal plane, fascia and subcutaneous tissue at each trocar site under direct endoscopic vision. The gallbladder, instruments and ports were removed from the field and pneumoperitoneum terminally released. All skin incisions were closed with subcuticular 4-0 Monocryl, steristrips and tegaderm. The patient awoke in satisfactory condition. I went to discuss the findings with her  in the waiting area.       Yusra Adams MD

## 2020-03-06 NOTE — BRIEF OP NOTE
BRIEF OPERATIVE NOTE    Date of Procedure: 3/6/2020   Preoperative Diagnosis: Cholelithiasis  Postoperative Diagnosis: Same  Procedure(s):  LAPAROSCOPIC CHOLECYSTECTOMY  Surgeon(s) and Role:     Farshad King MD - Primary         Surgical Assistant: Karina Archibald    Surgical Staff:  Circ-1: Shayan Wilson RN  Scrub Tech-1: Piero Platt  Surg Asst-1: Francesca Streeter  Event Time In Time Out   Incision Start 1305    Incision Close 1333      Anesthesia: General   Estimated Blood Loss: Min  Specimens:   ID Type Source Tests Collected by Time Destination   1 : gallbladder Preservative Gallbladder  James Baltazar MD 3/6/2020 1316 Pathology      Findings:  Thickened gallbladder wall with gallstones   Complications: none  Implants: * No implants in log *

## 2020-03-06 NOTE — BRIEF OP NOTE
OPERATIVE NOTE Date of Procedure: 3/6/2020 Preoperative Diagnosis: Cholelithiasis Postoperative Diagnosis: Same Procedure(s): LAPAROSCOPIC CHOLECYSTECTOMY Surgeon(s) and Role: Vamshi Riley MD - Primary Surgical Assistant: Jenna Rico Surgical Staff: 
Circ-1: Tacho Gray RN Scrub Tech-1: Josiejovita Sharpe 
Surg Asst-1: Mary Jo Mosley Event Time In Time Out Incision Start  Incision Close 1333 Anesthesia: General  
Estimated Blood Loss: Min 
Specimens:  
ID Type Source Tests Collected by Time Destination 1 : gallbladder Preservative Gallbladder  Gianni Caballero MD 3/6/2020 1316 Pathology Findings: Thickened gallbladder wall with gallstones Complications: none Implants: * No implants in log * Indication:  See paper H/P. Procedure:  After standard pre-anesthetic and pre-operative procedure nursing protocols, general anesthesia instituted. Wth the patient supine on the operating table, the abdomen was cleaned, prepped, and draped in usual sterile fashion. The laparoscopic camera and CO2 insufflation apparatus were affixed to the drapes in the usual fashion. Pre-incision antibiotics were given 30 minutes prior to skin incision. Pre-incision liposomal bupivicaine and 0.5% plain marcaine anesthetic was injected in the usual port sites of the skin. Through a 5mm horizontal right para-umbilical skin incision, the abdomen was entered under direct camera vision with a 5 mm blunt tissue dissecting port. Insufflation was established satisfactorily and maintained at 15mm. The laparoscopic camera was re-introduced and confirmed no gross evidence of intraabdominal visceral injury or bleeding in attaining access. Final midline horizontal port incisions were made, and under direct laparoscopic vision 5 mm and 12mm ventral ports were introduced. The patient was positioned in reverse Trendelenburg with left tilt.  The fundus was grasped and lifted up towards the diaphragm. The infundibulum was retracted laterally and inferiorly. There was thickened peritoneum here and required meticulous dissection in order to completely free the infundibulum and cystic duct. The junction of the cystic duct and gallbladder were clearly identified, and an adequate length of the cystic duct was dissected out. Similarly, the cystic artery was also dissected out and an adequate length was displayed. The critical view of Calot's triangle was obtained and the relavant structures clearly identified. The cystic duct was clipped high on the infundibulum as possible. Proximal cystic duct was clipped three times and divided with laparoscopic scissors. The cystic artery was clipped twice on the stay side and divided with laparoscopic scissors. With electrocautery, the gallbladder was gently dissected completely from the liver bed and placed in a retrieval bag. Hemostasis was satisfactory. The 5mm ports were removed under direct laparoscopic vision with no concern for preperitoneal or rectus bleeding. The remaining local anesthetic was injected in the pre-peritoneal plane, fascia and subcutaneous tissue at each trocar site under direct endoscopic vision. The gallbladder, instruments and ports were removed from the field and pneumoperitoneum terminally released. All skin incisions were closed with subcuticular 4-0 Monocryl, steristrips and tegaderm. The patient awoke in satisfactory condition. I went to discuss the findings with her  in the waiting area.    
 
Elliot Shabazz MD

## 2020-03-06 NOTE — DISCHARGE INSTRUCTIONS
PATIENT INSTRUCTIONS  GALL BLADDER SURGERY  (CHOLECYSTECTOMY)    FOLLOW-UP:  Please make an appointment with your physician in 2 week(s). Call your physician immediately if you have any fevers greater than 102.5, drainage from your wound that is not clear or looks infected, persistent bleeding, increasing abdominal pain, problems urinating, or persistent nausea/vomiting. You should be aware that you may have right shoulder pain after surgery and that this will progressively go away. This is called 'referred pain' and is from the area of the gallbladder. It can also be caused by gas that may be trapped under the diaphragm from the surgery, especially if it was performed laparoscopically through mini-incisions. This gas will progressively get reabsorbed by your body. WOUND CARE INSTRUCTIONS:  Keep a dry clean dressing on the wound if there is drainage. The initial bandage may be removed after 24 hours. Once the wound has quit draining you may leave it open to air. If clothing rubs against the wound or causes irritation and the wound is not draining you may cover it with a dry dressing during the daytime. Try to keep the wound dry and avoid ointments on the wound unless directed to do so. If the wound becomes bright red and painful or starts to drain infected material that is not clear, please contact your physician immediately. You should also call if you begin to drain fluid that is thin and greenish-brown from the wound and appears to look like bile. If the wound though is mildly pink and has a thick firm ridge underneath it, this is normal, and is referred to as a healing ridge. This will resolve over the next 4-6 weeks. DIET:  You may eat any foods that you can tolerate. It is a good idea to eat a high fiber diet and take in plenty of fluids to prevent constipation.   If you do become constipated you may want to take a mild laxative or take ducolax tablets on a daily basis until your bowel habits are regular. Constipation can be very uncomfortable, along with straining, after recent abdominal surgery. ACTIVITY:  You are encouraged to cough and deep breath or use your incentive spirometer if you were given one, every 15-30 minutes when awake. This will help prevent respiratory complications and low grade fevers post-operatively. You may want to hug a pillow when coughing and sneezing to add additional support to the surgical area(s) which will decrease pain during these times. You are encouraged to walk and engage in light activity for the next two weeks. You should not lift more than 20 pounds during this time frame as it could put you at increased risk for a post-operative hernia. Twenty pounds is roughly equivalent to a plastic bag of groceries. MEDICATIONS:  Try to take narcotic medications and anti-inflammatory medications, such as tylenol, ibuprofen, naprosyn, etc., with food. This will minimize stomach upset from the medication. Should you develop nausea and vomiting from the pain medication, or develop a rash, please discontinue the medication and contact your physician. You should not drive, make important decisions, or operate machinery when taking narcotic pain medication. QUESTIONS:  Please feel free to call your physician or the hospital  if you have any questions, and they will be glad to assist you. DISCHARGE SUMMARY from your Nurse    The following personal items collected during your admission are returned to you:   Dental Appliance:    Vision:    Hearing Aid:    Jewelry: Jewelry: None  Clothing: Clothing: Other (comment)(street clothes)  Other Valuables:  Other Valuables: None  Valuables sent to safe:      PATIENT INSTRUCTIONS:    After general anesthesia or intravenous sedation, for 24 hours or while taking prescription Narcotics:  · Limit your activities  · Do not drive and operate hazardous machinery  · Do not make important personal or business decisions  · Do  not drink alcoholic beverages  · If you have not urinated within 8 hours after discharge, please contact your surgeon on call. Report the following to your surgeon:  · Excessive pain, swelling, redness or odor of or around the surgical area  · Temperature over 100.5  · Nausea and vomiting lasting longer than 4 hours or if unable to take medications  · Any signs of decreased circulation or nerve impairment to extremity: change in color, persistent  numbness, tingling, coldness or increase pain  · Any questions    COUGH AND DEEP BREATHE    Breathing deep and coughing are very important exercises to do after surgery. Deep breathing and coughing open the little air tubes and air sacks in your lungs. You take deep breaths every day. You may not even notice - it is just something you do when you sigh or yawn. It is a natural exercise you do to keep these air passages open. After surgery, take deep breaths and cough, on purpose. Coughing and deep breathing help prevent bronchitis and pneumonia after surgery. If you had chest or belly surgery, use a pillow as a \"hug buddy\" and hold it tightly to your chest or belly when you cough. DIRECTIONS:  · Take 10 to 15 slow deep breaths every hour while awake. · Breathe in deeply, and hold it for 2 seconds. · Exhale slowly through puckered lips, like blowing up a balloon. · After every 4th or 5th deep breath, hug your pillow to your chest or belly and give a hard, deep cough. Yes, it will probably hurt. But doing this exercise is very important part of healing after surgery. Take your pain medicine to help you do this exercise without too much pain. IF YOU HAVE BEEN DIAGNOSED WITH SLEEP APNEA, PLEASE USE YOUR SLEEP APNEA DEVICE OR CPAP MACHINE WHEN YOU INTEND TO NAP AFTER TAKING PAIN MEDICATION.     Ankle Pumps    Ankle pumps increase the circulation of oxygenated blood to your lower extremities and decrease your risk for circulation problems such as blood clots. They also stretch the muscles, tendons and ligaments in your foot and ankle, and prevent joint contracture in the ankle and foot, especially after surgeries on the legs. It is important to do ankle pump exercises regularly after surgery because immobility increases your risk for developing a blood clot. Your doctor may also have you take an Aspirin for the next few days as well. If your doctor did not ask you to take an Aspirin, consult with him before starting Aspirin therapy on your own. Slowly point your foot forward, feeling the muscles on the top of your lower leg stretch, and hold this position for 5 seconds. Next, pull your foot back toward you as far as possible, stretching the calf muscles, and hold that position for 5 seconds. Repeat with the other foot. Perform 10 repetitions every hour while awake for both ankles if possible (down and then up with the foot once is one repetition). You should feel gentle stretching of the muscles in your lower leg when doing this exercise. If you feel pain, or your range of motion is limited, don't  Push too hard. Only go the limit your joint and muscles will let you go. If you have increasing pain, progressively worsening leg warmth or swelling, STOP the exercise and call your doctor. Below is information about the medications your doctor is prescribing after your visit:    Other information in your discharge envelope:  [x]     PRESCRIPTIONS  []     SCHOOL/WORK NOTE  []     INFECTION PREVENTION  []     REGIONAL NERVE BLOCK PAMPHLET  []     REGIONAL NERVE BLOCK ON QUE PAMPHLET   [x]     EXPAREL  []     HANDICAP APPLICATION         These are general instructions for a healthy lifestyle:    *  Please give a list of your current medications to your Primary Care Provider.   *  Please update this list whenever your medications are discontinued, doses are      changed, or new medications (including over-the-counter products) are added. *  Please carry medication information at all times in case of emergency situations. About Smoking  No smoking / No tobacco products / Avoid exposure to second hand smoke    Surgeon General's Warning:  Quitting smoking now greatly reduces serious risk to your health. Obesity, smoking, and sedentary lifestyle greatly increases your risk for illness and disease. A healthy diet, regular physical exercise & weight monitoring are important for maintaining a healthy lifestyle. Congestive Heart Failure  You may be retaining fluid if you have a history of heart failure or if you experience any of the following symptoms:  Weight gain of 3 pounds or more overnight or 5 pounds in a week, increased swelling in our hands or feet or shortness of breath while lying flat in bed. Please call your doctor as soon as you notice any of these symptoms; do not wait until your next office visit. [facility] MEDICATION AND SIDE EFFECT GUIDE    The [facility] MEDICATION AND SIDE EFFECT GUIDE was provided to the PATIENT AND CARE PROVIDER.   Information provided includes instruction about drug purpose and common side effects for the following medications:    · ZOFRAN  · [place]  · Ronne Chris   · Perla Lira

## 2020-03-06 NOTE — H&P
Patient interviewed and examined again. No change to paper H/P. Pretty Gastelum MD  [facility]  Office:  [phone]  Fax:  [phone]      Assessment:     Symptomatic Cholelithiasis      Plan:     Laparoscopic Cholecystectomy     Signed By: Pretty Gastelum MD  [facility]  Office:  [phone]  Fax:  [phone]    2020          General Surgery History and Physical    Subjective:      Getachew Lujan is a 38 y.o.  female who presents with symptomatic cholelithiasis. See paper history and physical for full details.     Past Medical History:   Diagnosis Date    Anxiety     Condyloma     H/O seasonal allergies     Leg ulcer, left ([facility] [address] 2019    [facility] at [facility] care center [facility] Migraines     Morbid obesity ([facility] [address]     Psychiatric disorder     anxiety    Trichomonal vulvovaginitis     Uterine polyp     Venous insufficiency 2020    diagnosed by Dr. Kelly Ramírez     Past Surgical History:   Procedure Laterality Date    HX  SECTION      [address] [place] [address] AND CURETTAGE      D&E for anecephale    HX GYN  2013    hysteroscopy with polypectomy and ablation    HX HYSTEROSCOPY WITH ENDOMETRIAL ABLATION      HX ORTHOPAEDIC Bilateral 2005    ingrown toenail ablation      Family History   Problem Relation Age of Onset    Nural tube defect Child      Social History     Socioeconomic History    Marital status: SINGLE     Spouse name: Not on file    Number of children: Not on file    Years of education: Not on file    Highest education level: Not on file   Tobacco Use    Smoking status: Current Every Day Smoker     Packs/day: 1.00     Years: 20.00     Pack years: 20.00    Smokeless tobacco: Never Used   Substance and Sexual Activity    Alcohol use: No    Drug use: No    Sexual activity: Yes     Partners: Male     Birth control/protection: Surgical     Comment: partner with vasectomy      Current Facility-Administered Medications   Medication Dose Route Frequency    lactated Ringers infusion  125 mL/hr IntraVENous CONTINUOUS    lidocaine (PF) (XYLOCAINE) 10 mg/mL (1 %) injection 0.1 mL  0.1 mL SubCUTAneous PRN    ceFAZolin (ANCEF) 3 g in 0.9%  ml IVPB  3 g IntraVENous ONCE      Allergies   Allergen Reactions    Wellbutrin [Bupropion Hcl] Hives       Review of Systems:     []     Unable to obtain  ROS due to  []    mental status change  []    sedated   []    intubated   [x]    Total of 12 system negative, unless specified below or in HPI:  Constitutional: negative fever, negative chills, negative weight loss  Eyes:   negative visual changes  ENT:   negative sore throat, tongue or lip swelling  Respiratory:  negative cough, negative dyspnea  Cards:  negative for chest pain, palpitations, lower extremity edema  GI:   See HPI  :  negative for frequency, dysuria  Integument:  negative for rash and pruritus  Heme:  negative for easy bruising and gum/nose bleeding  Musculoskel: negative for myalgias,  back pain and muscle weakness  Neuro:  negative for headaches, dizziness, vertigo  Psych:  negative for feelings of anxiety, depression     Objective:        Patient Vitals for the past 8 hrs:   BP Temp Pulse Resp SpO2 Height Weight   20 1035 164/81 98.3 °F (36.8 °C) 91 16 96 % 5' 11\" (1.803 m) 138.5 kg (305 lb 4 oz)       Temp (24hrs), Av.3 °F (36.8 °C), Min:98.3 °F (36.8 °C), Max:98.3 °F (36.8 °C)      Physical Exam:  General:  Alert, cooperative, no distress, appears stated age. Eyes:  Conjunctivae/corneas clear. PERRL, EOMs intact. Nose: Nares normal. Septum midline. Mucosa normal. No drainage or sinus tenderness. Mouth/Throat: Lips, mucosa, and tongue normal. Teeth and gums normal.   Neck: Supple, symmetrical, trachea midline, no adenopathy, thyroid: no enlargment/tenderness/nodules, no carotid bruit and no JVD. Back:   Symmetric, no curvature. ROM normal. No CVA tenderness. Lungs:   Clear to auscultation bilaterally.    Heart: Regular rate and rhythm, S1, S2 normal, no murmur, click, rub or gallop. Abdomen:   Soft, epigastric tenderness. Bowel sounds normal. No masses,  No organomegaly. Extremities: Extremities normal, atraumatic, no cyanosis or edema. Pulses: 2+ and symmetric all extremities. Skin: Skin color, texture, turgor normal. No rashes or lesions   Lymph nodes: Cervical, supraclavicular, and axillary nodes normal.     All lab results for the last 24 hours reviewed.

## 2020-03-06 NOTE — DISCHARGE SUMMARY
Discharge Summary    Patient: Daniel Sinclair               Sex: female          DOA: 3/6/2020 10:02 AM       YOB: 1975      Age:  38 y.o.        LOS:  LOS: 0 days                Discharge Date:      Admission Diagnoses: GALL STONES    Discharge Diagnoses:  Same    Procedure:  Procedure(s):  LAPAROSCOPIC CHOLECYSTECTOMY    Discharge Condition: Good    Hospital Course: Unremarkable operative procedure. Discharge to home in stable condition. Consults: None    Significant Diagnostic Studies: See full electronic record. Discharge Medications:     Current Discharge Medication List      START taking these medications    Details   oxyCODONE IR (ROXICODONE) 5 mg immediate release tablet Take 1 Tab by mouth every four (4) hours as needed for Pain for up to 3 days. Max Daily Amount: 30 mg. Indications: pain, Acute post-operative pain  Qty: 10 Tab, Refills: 0    Associated Diagnoses: Symptomatic cholelithiasis      polyethylene glycol (MIRALAX) 17 gram packet Take 1 Packet by mouth daily. 17 g = 1 packet  Indications: constipation, If constipation last more than 24-48 hours, despite colace use. Qty: 238 g, Refills: 0      ondansetron (ZOFRAN ODT) 4 mg disintegrating tablet Take 1 Tab by mouth every six (6) hours as needed for Nausea. Indications: prevent nausea and vomiting after surgery  Qty: 12 Tab, Refills: 1         CONTINUE these medications which have NOT CHANGED    Details   naproxen sodium (ALEVE) 220 mg cap Take  by mouth.      cinnamon bark (CINNAMON PO) Take 2,000 mg by mouth daily. Activity/Diet/Wound Care: See patient administered discharge instructions.     Follow-up: 2 weeks    Kofi Keating MD  [facility]  Office:  [phone]  Fax:  [phone]

## 2020-03-06 NOTE — ANESTHESIA PREPROCEDURE EVALUATION
Relevant Problems   No relevant active problems       Anesthetic History   No history of anesthetic complications            Review of Systems / Medical History  Patient summary reviewed, nursing notes reviewed and pertinent labs reviewed    Pulmonary  Within defined limits                 Neuro/Psych   Within defined limits           Cardiovascular  Within defined limits                     GI/Hepatic/Renal  Within defined limits              Endo/Other        Morbid obesity     Other Findings              Physical Exam    Airway  Mallampati: II  TM Distance: 4 - 6 cm  Neck ROM: normal range of motion   Mouth opening: Normal     Cardiovascular    Rhythm: regular  Rate: normal         Dental    Dentition: Lower partial plate and Full upper dentures     Pulmonary  Breath sounds clear to auscultation               Abdominal         Other Findings            Anesthetic Plan    ASA: 2  Anesthesia type: general          Induction: Intravenous  Anesthetic plan and risks discussed with: Patient

## 2020-03-06 NOTE — ANESTHESIA POSTPROCEDURE EVALUATION
Procedure(s):  LAPAROSCOPIC CHOLECYSTECTOMY.     general    Anesthesia Post Evaluation        Patient location during evaluation: PACU  Level of consciousness: awake  Pain management: adequate  Airway patency: patent  Anesthetic complications: no  Cardiovascular status: acceptable  Respiratory status: acceptable  Hydration status: acceptable  Post anesthesia nausea and vomiting:  none      Vitals Value Taken Time   /62 3/6/2020  2:20 PM   Temp 36.7 °C (98 °F) 3/6/2020  1:41 PM   Pulse 78 3/6/2020  2:20 PM   Resp 15 3/6/2020  2:20 PM   SpO2 98 % 3/6/2020  2:20 PM

## 2021-06-03 ENCOUNTER — HOSPITAL ENCOUNTER (OUTPATIENT)
Dept: WOUND CARE | Age: 46
Discharge: HOME | End: 2021-06-03
Admitting: ORTHOPAEDIC SURGERY
Payer: MEDICAID

## 2021-06-03 VITALS
TEMPERATURE: 98.5 F | WEIGHT: 290 LBS | DIASTOLIC BLOOD PRESSURE: 84 MMHG | RESPIRATION RATE: 18 BRPM | BODY MASS INDEX: 40.6 KG/M2 | HEIGHT: 71 IN | SYSTOLIC BLOOD PRESSURE: 139 MMHG | HEART RATE: 84 BPM

## 2021-06-03 DIAGNOSIS — I87.2 VENOUS (PERIPHERAL) INSUFFICIENCY: Primary | ICD-10-CM

## 2021-06-03 DIAGNOSIS — I73.9 PERIPHERAL VASCULAR DISEASE (HCC): ICD-10-CM

## 2021-06-03 PROCEDURE — 74011000250 HC RX REV CODE- 250: Performed by: ORTHOPAEDIC SURGERY

## 2021-06-03 PROCEDURE — 99205 OFFICE O/P NEW HI 60 MIN: CPT

## 2021-06-03 PROCEDURE — 11042 DBRDMT SUBQ TIS 1ST 20SQCM/<: CPT

## 2021-06-03 RX ORDER — NAPROXEN SODIUM 220 MG
220 TABLET ORAL
COMMUNITY

## 2021-06-03 RX ORDER — LIDOCAINE HYDROCHLORIDE 20 MG/ML
15 SOLUTION OROPHARYNGEAL AS NEEDED
Status: DISCONTINUED | OUTPATIENT
Start: 2021-06-03 | End: 2021-06-05 | Stop reason: HOSPADM

## 2021-06-03 NOTE — WOUND CARE
[address]   Progress Note and Procedure Note     [address] RECORD NUMBER:  [id number]  AGE: 40 y.o. RACE WHITE  GENDER: female  : 1975  EPISODE DATE:  6/3/2021      Subjective:     Chief Complaint   Patient presents with    Wound Check     right lower leg         HISTORY of PRESENT ILLNESS HPI    Andriy Husain is a 40 y.o. female who presents today for wound/ulcer evaluation.    History of Wound Context: Started having wound on the right leg for the last 3 to 4 months, worse in the last 3 to 4 weeks, pain in the posterior calf is present aching type, she has had previous ulcerations, she has swelling of both the legs, the right is worse than the left, the left does not breakdown but the right is, she has had multiple venous ablations without any significant relief, she has gained weight over the years and has decreased exercise and has led to worsening of the swelling in both the legs, denies any diabetes, she is a smoker, she does not take any medications  Wound/Ulcer Pain Timing/Severity: None   Quality of pain: none  Severity:  0/ 10   Modifying Factors: Pain worsens with walking  Associated Signs/Symptoms: edema          PAST MEDICAL HISTORY    Past Medical History:   Diagnosis Date    Anxiety     Condyloma     H/O seasonal allergies     Leg ulcer, left ([address] 2019    treated at wound care center [facility] Migraines     Morbid obesity ([facility] [address]     Psychiatric disorder     anxiety    Trichomonal vulvovaginitis     Uterine polyp     Venous insufficiency 2020    diagnosed by Dr. Griselda Sara    Past Surgical History:   Procedure Laterality Date    HX  SECTION      [address] [place] [address] AND CURETTAGE      D&E for anecephale    HX GI      HX GYN  2013    hysteroscopy with polypectomy and ablation    HX HYSTEROSCOPY WITH ENDOMETRIAL ABLATION      HX ORTHOPAEDIC Bilateral 2005    ingrown toenail ablation       FAMILY HISTORY    Family History   Problem Relation Age of Onset    Nural tube defect Child        SOCIAL HISTORY    Social History     Tobacco Use    Smoking status: Current Every Day Smoker     Packs/day: 1.00     Years: 20.00     Pack years: 20.00    Smokeless tobacco: Never Used   Vaping Use    Vaping Use: Never used   Substance Use Topics    Alcohol use: No    Drug use: No       ALLERGIES    Allergies   Allergen Reactions    Wellbutrin [Bupropion Hcl] Hives       MEDICATIONS    Current Outpatient Medications on File Prior to Encounter   Medication Sig Dispense Refill    naproxen sodium (Aleve) 220 mg tablet Take 220 mg by mouth three (3) times daily (with meals). PRN       No current facility-administered medications on file prior to encounter. REVIEW OF SYSTEMS    A comprehensive review of systems was negative except for that written in the HPI. Objective:     Visit Vitals  /84 (BP 1 Location: Right upper arm, BP Patient Position: At rest;Sitting)   Pulse 84   Temp 98.5 °F (36.9 °C)   Resp 18   Ht 5' 11\" (1.803 m)   Wt 131.5 kg (290 lb)   BMI 40.45 kg/m²       Wt Readings from Last 3 Encounters:   06/03/21 131.5 kg (290 lb)   03/06/20 138.5 kg (305 lb 4 oz)   02/28/20 138.5 kg (305 lb 4 oz)       PHYSICAL EXAM    Constitutional: Patient is awake, ambulating with no devices , no acute distress  Head: normocephalic, atraumatic. Behavioral/Psych: patient is pleasant, cooperative, awake and alert    Debridement Wound Care        Problem List Items Addressed This Visit     None          Procedure Note  Indications:  Based on my examination of this patient's wound(s)/ulcer(s) today, debridement is required to promote healing and evaluate the wound base.     Performed by: Patricia York MD    Consent obtained: yes  Time out taken: yes  Debridement: excisional    Using curette/scalpel the wound(s)/ulcer(s) was/were sharply debrided down through and including the removal of    subcutaneous tissue    Devitalized Tissue Debrided: slough    Pre Debridement Measurements:  Are located in the [facility]  Documentation Flow Sheet      Wound/Ulcer #: 1    Post Debridement Measurements:  Wound/Ulcer Descriptions are Pre Debridement except measurements:    Wound Leg lower Right;Medial #1 (Active)   Wound Image   06/03/21 0921   Wound Etiology Venous 06/03/21 0921   Dressing Status Clean;Dry; Intact 06/03/21 0921   Cleansed Cleansed with saline 06/03/21 0921   Wound Length (cm) 1.2 cm 06/03/21 0921   Wound Width (cm) 1 cm 06/03/21 2634   Wound Depth (cm) 0.4 cm 06/03/21 0921   Wound Surface Area (cm^2) 1.2 cm^2 06/03/21 0921   Wound Volume (cm^3) 0.48 cm^3 06/03/21 0921   Post-Procedure Length (cm) 1.4 cm 06/03/21 0921   Post-Procedure Width (cm) 1.2 cm 06/03/21 6880   Post-Procedure Depth (cm) 0.6 cm 06/03/21 0921   Post-Procedure Surface Area (cm^2) 1.68 cm^2 06/03/21 0921   Post-Procedure Volume (cm^3) 1.01 cm^3 06/03/21 0921   Wound Assessment Slough;Granulation tissue 06/03/21 0921   Drainage Amount Small 06/03/21 0921   Drainage Description Serosanguinous 06/03/21 0921   Wound Odor None 06/03/21 0921   Rosalva-Wound/Incision Assessment Intact 06/03/21 0921   Wound Thickness Description Full thickness 06/03/21 0921   Number of days: 0        Total Surface Area Debrided:  1 sq cm     Estimated Blood Loss:  Minimal     Hemostasis Achieved: with pressure    Procedural Pain: 0/ 10     Post Procedural Pain: 0/ 10     Response to treatment: Well-tolerated by the patient  Reports small superficial papules consistent with early breakdown for venous ulceration  She has good pulses    Assessment:      Problem List Items Addressed This Visit     None      Peripheral venous insufficiency right leg  Ulcer posterior calf right leg  Stasis dermatitis bilateral lower extremities, morbid obesity, tobacco dependence  POP IN PROCEDURE TYPE (DEBRIDEMENT, BIOPSY, I&D, SKIN SUB, CHEMICAL CAUERTY)     Plan:     Treatment Note please see attached Discharge Instructions I have recommended debridement and Profore dressings, patient will need vascular work-up, she was advised to lose weight and stop smoking, will continue with the compression treatment, consideration for lymphedema pump    Written patient dismissal instructions given to patient or POA.          Electronically signed by Nelli Perez MD on 6/3/2021 at 11:02 AM

## 2021-06-03 NOTE — WOUND CARE
Multilayer Compression Wrap   (Not Unna) Below the Knee    NAME:  Felix Carson  YOB: 1975  MEDICAL RECORD NUMBER:  [id number]  DATE:  6/3/2021    Applied moisturizing agent to dry skin as needed. Applied primary and secondary dressing as ordered. Applied multilayered dressing below the knee to right lower leg. Instructed patient/caregiver not to remove dressing and to keep it clean and dry. Instructed patient/caregiver on complications to report to provider, such as pain, numbness in toes, heavy drainage, and slippage of dressing. Instructed patient on purpose of compression dressing and on activity and exercise recommendations.     Response to treatment: Well tolerated by patient       Electronically signed by Chris Elam RN on 6/3/2021 at 10:35 AM

## 2021-06-03 NOTE — DISCHARGE INSTRUCTIONS
Discharge Instructions for  [address] [place] [address], [address] [facility]  Telephone: [phone]     NAME:  Misael Massey OF BIRTH:  1975  MEDICAL RECORD NUMBER:  [id number]  DATE:  6/3/2021        Return Appointment:  []? Dressing supply provider:  []? Home Healthcare:  [x]? Return Appointment: 2 Week(s)  [x]? Nurse Visit: 1   Week(s)     []? Discharge from [facility]  [x]? Ordered tests: Venous Reflux and PVR  []? Referrals:   []? Rx:      Wound Cleansing:   Do not scrub or use excessive force. Cleanse wound prior to applying a clean dressing with:  [x]? Normal Saline          [x]? Mild Soap & Water    []? Keep Wound Dry in Shower  []? Other:       Topical Treatments:  Do not apply lotions, creams, or ointments to wound bed unless directed. [x]? Apply moisturizing lotion to skin surrounding the wound prior to dressing change. []? Apply antifungal ointment to skin surrounding the wound prior to dressing change. []? Apply thin film of moisture barrier ointment to skin immediately around wound. []? Other:                   Dressings:                  Wound Location right leg     []? Apply Primary Dressing:                                          []? MediHoney Gel         []? MediHoney Alginate                     []? Calcium Alginate      []? Calcium Alginate with Silver              []? Collagen                   []? Collagen with Silver                []? Santyl with Moistened saline gauze              []? Hydrofera Blue (cut to size and moistened)  []? Hydrofera Blue Ready (Cut to size)              []? NS wet to dry            []? Betadine wet to dry              []? Hydrogel                   []? Mepitel                   []? Bactroban/Mupirocin                       [x]? Other: silvercel  [x]? Cover and Secure with:                   [x]? Gauze        [x]? Rajesh Coles           []? Kerlix              []? Foam          []?  Super Absorbant []? ABD                                      []? ACE Wrap            []? Other:               Avoid contact of tape with skin.     [x]? Change dressing:  []? Daily           []? Every Other Day    [x]? Once per week   []? Twice per week              []? Three times per week        []? Do Not Change Dressing    []? Other:                Negative Pressure:           Wound Location:   []? Pressure @  mm/Hg                      []?Continuous  []? Intermittent              []? Black          []? White Foam              []? Bridge:               []? Change vac dressing three times per week     Pressure Relief:  []? When sitting, shift position or do seat lifts every 15 minutes. []? Wheelchair cushion           []? Specialty Bed/Mattress  []? Turn every 2 hours when in bed. Avoid direct pressure on wound site. Limit side lying to 30 degree tilt. Limit HOB elevation to 30 degrees. Edema Control:  Apply:  [x]? Compression Stocking      []? Right Leg     [x]? Left Leg              []? Tubigrip      []? Right Leg Double Layer      []? Left Leg Double Layer                                      []?Right Leg Single Layer       []? Left Leg Single Layer                 [x]? Elevate leg(s) above the level of the heart when sitting. [x]? Avoid prolonged standing in one place.         Compression:  Apply:  [x]? Multilayer Compression Wrap:      [x]? RightLeg      []? Left Leg                                 [x]? Profore            []? Profore Lite             []?Unna                 [x]? Do not get leg(s) with wrap wet. If wraps become too tight call the center or completely remove the wrap. [x]? Elevate leg(s) above the level of the heart when sitting. [x]? Avoid prolonged standing in one place.     Off-Loading:   []? Off-loading when   []? walking       []? in bed         []? sitting  []? Total non-weight bearing  []? Right Leg  []? Left Leg         []?  Assistive Device []? Dayne Mcknight        []? Cane      []? Wheelchair      []? Crutches              []? Surgical shoe    []? Podus Boot(s)   []? Foam Boot(s)  []? Roll About              []? Cast Boot   []? CROW Boot  []? Wedge Shoe  []? Other:     Contact Cast:  Apply:  []? Total Contact Cast Applied in Clinic          []? RightLeg      []? Left Leg              []? Do not get cast wet. Contact center or go to emergency room if there is a foul odor or becomes uncomfortable due to feeling tight or swelling. Do not use objects inside of cast to scratch.                  Dietary:  [x]? Diet as tolerated:   []? Calorie Diabetic Diet:         []? No Added Salt:  []? Increase Protein:   []? Other:              Activity:  [x]? Activity as tolerated:    []? Patient has no activity restrictions       []? Strict Bedrest:          []? Remain off Work:       []? May return to full duty work:                                               []? Return to work with restrictions:      [address] [facility] [employer] [facility] [place] Information: Should you experience any significant changes in your wound(s) or have questions about your wound care, please contact [address] at [place] [address] 8:00 am - 4:30. If you need help with your wound outside of these hours and cannot wait until we are again available, contact your PCP or go to the hospital emergency room.      PLEASE NOTE: IF YOU ARE UNABLE TO [address], CONTINUE TO USE THE SUPPLIES YOU HAVE AVAILABLE UNTIL YOU ARE ABLE TO [facility]. IT IS MOST IMPORTANT TO KEEP THE WOUND COVERED AT ALL TIMES.     []? Dr. Dione Alpers   []? Dr. Laurence Tamez  [x]? Dr. Kerri Menchaca  []?  Dr. Tani Worthy

## 2021-06-10 ENCOUNTER — HOSPITAL ENCOUNTER (OUTPATIENT)
Dept: WOUND CARE | Age: 46
Discharge: HOME | End: 2021-06-10
Admitting: ORTHOPAEDIC SURGERY
Payer: MEDICAID

## 2021-06-10 VITALS
SYSTOLIC BLOOD PRESSURE: 126 MMHG | TEMPERATURE: 98.4 F | HEART RATE: 85 BPM | RESPIRATION RATE: 18 BRPM | DIASTOLIC BLOOD PRESSURE: 75 MMHG

## 2021-06-10 PROCEDURE — 29581 APPL MULTLAYER CMPRN SYS LEG: CPT

## 2021-06-10 NOTE — WOUND CARE
Multilayer Compression Wrap 
 (Not Unna) Below the Knee NAME:  Mark Aragon YOB: 1975 MEDICAL RECORD NUMBER:  [id number] DATE:  6/10/2021 Removed old Multilayer wrap if indicated and wash leg with mild soap/water. Applied primary and secondary dressing as ordered. Applied multilayered dressing below the knee to right lower leg. Instructed patient/caregiver not to remove dressing and to keep it clean and dry. Instructed patient/caregiver on complications to report to provider, such as pain, numbness in toes, heavy drainage, and slippage of dressing. Instructed patient on purpose of compression dressing and on activity and exercise recommendations. Response to treatment: Well tolerated by patient Electronically signed by Rachel Contreras LPN on 7/30/6871 at 49:90 AM

## 2021-06-17 ENCOUNTER — HOSPITAL ENCOUNTER (OUTPATIENT)
Dept: WOUND CARE | Age: 46
Discharge: HOME | End: 2021-06-17
Payer: MEDICAID

## 2021-06-17 VITALS
RESPIRATION RATE: 18 BRPM | HEART RATE: 91 BPM | SYSTOLIC BLOOD PRESSURE: 124 MMHG | DIASTOLIC BLOOD PRESSURE: 80 MMHG | TEMPERATURE: 99.8 F

## 2021-06-17 PROCEDURE — 29581 APPL MULTLAYER CMPRN SYS LEG: CPT

## 2021-06-17 PROCEDURE — 74011000250 HC RX REV CODE- 250: Performed by: ORTHOPAEDIC SURGERY

## 2021-06-17 RX ORDER — LIDOCAINE HYDROCHLORIDE 20 MG/ML
15 SOLUTION OROPHARYNGEAL AS NEEDED
Status: DISCONTINUED | OUTPATIENT
Start: 2021-06-17 | End: 2021-06-19 | Stop reason: HOSPADM

## 2021-06-17 NOTE — WOUND CARE
Discharge Instructions for  [address] [place] [address], [facility]  Telephone: [phone]    NAME:  Shannon Abernathy OF BIRTH:  1975  MEDICAL RECORD NUMBER:  [id number]  DATE:  6/17/2021      Return Appointment:  [] Dressing supply provider:  [] Home Healthcare:  [x] Return Appointment: 2 Week(s)  [x] Nurse Visit: 1   Week(s)    [] Discharge from [facility]  [x] Ordered tests: Venous Reflux and PVR  [] Referrals:   [] Rx:     Wound Cleansing:   Do not scrub or use excessive force. Cleanse wound prior to applying a clean dressing with:  [x] Normal Saline   [x] Mild Soap & Water    [] Keep Wound Dry in Shower  [] Other:      Topical Treatments:  Do not apply lotions, creams, or ointments to wound bed unless directed. [x] Apply moisturizing lotion to skin surrounding the wound prior to dressing change.  [] Apply antifungal ointment to skin surrounding the wound prior to dressing change.  [] Apply thin film of moisture barrier ointment to skin immediately around wound. [] Other:        Dressings:           Wound Location right leg   [] Apply Primary Dressing:       [] MediHoney Gel    [] MediHoney Alginate               [] Calcium Alginate      [] Calcium Alginate with Silver   [] Collagen                   [] Collagen with Silver                [] Santyl with Moistened saline gauze              [] Hydrofera Blue (cut to size and moistened)  [] Hydrofera Blue Ready (Cut to size)   [] NS wet to dry    [] Betadine wet to dry              [] Hydrogel                [] Mepitel     [] Bactroban/Mupirocin               [x] Other: silvercel  [x] Cover and Secure with:     [x] Gauze [x] Tiffanie [] Kerlix   [] Foam [] Super Absorbant [] ABD     [] ACE Wrap            [] Other:    Avoid contact of tape with skin.     [x] Change dressing: [] Daily    [] Every Other Day [x] Once per week   [] Twice per week   [] Three times per week [] Do Not Change Dressing   [] Other:     Negative Pressure:           Wound Location:   [] Pressure @  mm/Hg  []Continuous []Intermittent   [] Black  [] White Foam              [] Bridge:               [] Change vac dressing three times per week    Pressure Relief:  [] When sitting, shift position or do seat lifts every 15 minutes.  [] Wheelchair cushion [] Specialty Bed/Mattress  [] Turn every 2 hours when in bed. Avoid direct pressure on wound site. Limit side lying to 30 degree tilt. Limit HOB elevation to 30 degrees. Edema Control:  Apply: [x] Compression Stocking []Right Leg [x]Left Leg   [] Tubigrip []Right Leg Double Layer []Left Leg Double Layer      []Right Leg Single Layer []Left Leg Single Layer     [x] Elevate leg(s) above the level of the heart when sitting. [x] Avoid prolonged standing in one place. Compression:  Apply: [x] Multilayer Compression Wrap: [x]RightLeg []Left Leg                                 [x]Profore  []Profore Lite             []Unna     [x] Do not get leg(s) with wrap wet. If wraps become too tight call the center or completely remove the wrap. [x] Elevate leg(s) above the level of the heart when sitting. [x] Avoid prolonged standing in one place. Off-Loading:   [] Off-loading when [] walking  [] in bed [] sitting  [] Total non-weight bearing  [] Right Leg  [] Left Leg   [] Assistive Device [] Walker [] Cane      [] Wheelchair  [] Crutches   [] Surgical shoe    [] Podus Boot(s)   [] Foam Boot(s)  [] Roll About    [] Cast Boot [] CROW Boot  [] Wedge Shoe  [] Other:    Contact Cast:  Apply: [] Total Contact Cast Applied in Clinic []RightLeg []Left Leg   [] Do not get cast wet. Contact center or go to emergency room if there is a foul odor or becomes uncomfortable due to feeling tight or swelling. Do not use objects inside of cast to scratch.       Dietary:  [x] Diet as tolerated: [] Calorie Diabetic Diet: [] No Added Salt:  [] Increase Protein: [] Other:     Activity:  [x] Activity as tolerated:    [] Patient has no activity restrictions       [] Strict Bedrest:   [] Remain off Work:       [] May return to full duty work:                                     [] Return to work with restrictions:                    [address] [facility] [address] Information: Should you experience any significant changes in your wound(s) or have questions about your wound care, please contact [address] at [place] [address] 8:00 am - 4:30. If you need help with your wound outside of these hours and cannot wait until we are again available, contact your PCP or go to the hospital emergency room. PLEASE NOTE: IF YOU ARE UNABLE TO OBTAIN WOUND SUPPLIES, CONTINUE TO USE THE SUPPLIES YOU HAVE AVAILABLE UNTIL YOU ARE ABLE TO REACH US. IT IS MOST IMPORTANT TO KEEP THE WOUND COVERED AT ALL TIMES.     [] Dr. Tahir Duncan   [] Dr. Whitley Kerr  [x] Dr. Lan Connolly  [] Dr. Aye Malcolm

## 2021-06-17 NOTE — WOUND CARE
[address]   Progress Note and Procedure Note     [address] RECORD NUMBER:  [id number]  AGE: 40 y.o. RACE WHITE  GENDER: female  : 1975  EPISODE DATE:  2021      Subjective:     Chief Complaint   Patient presents with    Wound Check     right  leg         HISTORY of PRESENT ILLNESS HPI    Baljit Nelson is a 40 y.o. female who presents today for wound/ulcer evaluation.    History of Wound Context: Started having wound on the right leg for the last 3 to 4 months, worse in the last 3 to 4 weeks, pain in the posterior calf is present aching type, she has had previous ulcerations, she has swelling of both the legs, the right is worse than the left, the left does not breakdown but the right is, she has had multiple venous ablations without any significant relief, she has gained weight over the years and has decreased exercise and has led to worsening of the swelling in both the legs, denies any diabetes, she is a smoker, she does not take any medications  Wound/Ulcer Pain Timing/Severity: None   Quality of pain: none  Severity:  0/ 10   Modifying Factors: Pain worsens with walking  Associated Signs/Symptoms: edema          PAST MEDICAL HISTORY    Past Medical History:   Diagnosis Date    Anxiety     Condyloma     H/O seasonal allergies     Leg ulcer, left ([address] 2019    treated at wound care center [facility]     Morbid obesity [facility] [address]     Psychiatric disorder     anxiety    Trichomonal vulvovaginitis     Uterine polyp     Venous insufficiency 2020    diagnosed by Dr. Devyn Burrell    Past Surgical History:   Procedure Laterality Date    HX  SECTION      [address] [place] [address] AND CURETTAGE      D&E for anecephale    HX GI      HX GYN  2013    hysteroscopy with polypectomy and ablation    HX HYSTEROSCOPY WITH ENDOMETRIAL ABLATION      HX ORTHOPAEDIC Bilateral 2005    ingrown toenail ablation       FAMILY HISTORY    Family History   Problem Relation Age of Onset    Nural tube defect Child        SOCIAL HISTORY    Social History     Tobacco Use    Smoking status: Current Every Day Smoker     Packs/day: 0.50     Years: 20.00     Pack years: 10.00    Smokeless tobacco: Never Used   Vaping Use    Vaping Use: Never used   Substance Use Topics    Alcohol use: No    Drug use: No       ALLERGIES    Allergies   Allergen Reactions    Wellbutrin [Bupropion Hcl] Hives       MEDICATIONS    Current Outpatient Medications on File Prior to Encounter   Medication Sig Dispense Refill    naproxen sodium (Aleve) 220 mg tablet Take 220 mg by mouth three (3) times daily (with meals). PRN       No current facility-administered medications on file prior to encounter. REVIEW OF SYSTEMS    A comprehensive review of systems was negative except for that written in the HPI. Objective:     Visit Vitals  /80 (BP 1 Location: Right upper arm, BP Patient Position: At rest;Sitting)   Pulse 91   Temp 99.8 °F (37.7 °C)   Resp 18       Wt Readings from Last 3 Encounters:   06/03/21 131.5 kg (290 lb)   03/06/20 138.5 kg (305 lb 4 oz)   02/28/20 138.5 kg (305 lb 4 oz)       PHYSICAL EXAM    Constitutional: Patient is awake, ambulating with no devices , no acute distress  Head: normocephalic, atraumatic. Behavioral/Psych: patient is pleasant, cooperative, awake and alert    Debridement Wound Care        Problem List Items Addressed This Visit     None          Wound/Ulcer Descriptions are Pre Debridement except measurements:Non-Pressure ulcer, fat layer exposed    Wound Leg lower Right;Medial #1 (Active)   Wound Image   06/17/21 0936   Wound Etiology Venous 06/17/21 0936   Dressing Status Clean;Dry; Intact 06/17/21 0936   Cleansed Cleansed with saline 06/17/21 0936   Wound Length (cm) 1 cm 06/17/21 0936   Wound Width (cm) 6 cm 06/17/21 0936   Wound Depth (cm) 0.2 cm 06/17/21 0936   Wound Surface Area (cm^2) 6 cm^2 06/17/21 3932 Change in Wound Size % -400 06/17/21 0936   Wound Volume (cm^3) 1.2 cm^3 06/17/21 0936   Wound Healing % -150 06/17/21 0936   Post-Procedure Length (cm) 1.4 cm 06/03/21 0921   Post-Procedure Width (cm) 1.2 cm 06/03/21 8086   Post-Procedure Depth (cm) 0.6 cm 06/03/21 0921   Post-Procedure Surface Area (cm^2) 1.68 cm^2 06/03/21 0921   Post-Procedure Volume (cm^3) 1.01 cm^3 06/03/21 0921   Wound Assessment Slough;Pink/red;Granulation tissue 06/17/21 0936   Drainage Amount Moderate 06/17/21 0936   Drainage Description Serosanguinous 06/17/21 0936   Wound Odor None 06/17/21 0936   Rosalva-Wound/Incision Assessment Intact 06/17/21 0936   Edges Epibole (rolled edges) 06/17/21 0936   Wound Thickness Description Full thickness 06/17/21 0936   Number of days: 14              Problem List Items Addressed This Visit     None              Assessment:      Problem List Items Addressed This Visit     None      Peripheral venous insufficiency right leg  Ulcer posterior calf right leg  Stasis dermatitis bilateral lower extremities, morbid obesity, tobacco dependence  POP IN PROCEDURE TYPE (DEBRIDEMENT, BIOPSY, I&D, SKIN SUB, CHEMICAL CAUERTY)     Plan:     Treatment Note please see attached Discharge Instructions I have recommended debridement and Profore dressings, patient will need vascular work-up, she was advised to lose weight and stop smoking, will continue with the compression treatment, consideration for lymphedema pump    Written patient dismissal instructions given to patient or POA.          Electronically signed by Jodee Melendez MD on 6/17/2021 at 11:02 AM

## 2021-06-17 NOTE — DISCHARGE INSTRUCTIONS
Discharge Instructions for  [address] [place] [address], [address] [place] [address]  Telephone: [phone]     NAME:  Brent Urias OF BIRTH:  1975  MEDICAL RECORD NUMBER:  [id number]  DATE:  6/17/2021        Return Appointment:  []? Dressing supply provider:  []? Home Healthcare:  [x]? Return Appointment: 2 Week(s)  [x]? Nurse Visit: 1   Week(s)     []? Discharge from [facility]  [x]? Ordered tests: Venous Reflux and PVR  []? Referrals:   []? Rx:      Wound Cleansing:   Do not scrub or use excessive force. Cleanse wound prior to applying a clean dressing with:  [x]? Normal Saline          [x]? Mild Soap & Water    []? Keep Wound Dry in Shower  []? Other:       Topical Treatments:  Do not apply lotions, creams, or ointments to wound bed unless directed. [x]? Apply moisturizing lotion to skin surrounding the wound prior to dressing change. []? Apply antifungal ointment to skin surrounding the wound prior to dressing change. []? Apply thin film of moisture barrier ointment to skin immediately around wound. []? Other:                   Dressings:                  Wound Location right leg     []? Apply Primary Dressing:                                          []? MediHoney Gel         []? MediHoney Alginate                     []? Calcium Alginate      []? Calcium Alginate with Silver              []? Collagen                   []? Collagen with Silver                []? Santyl with Moistened saline gauze              []? Hydrofera Blue (cut to size and moistened)  []? Hydrofera Blue Ready (Cut to size)              []? NS wet to dry            []? Betadine wet to dry              []? Hydrogel                   []? Mepitel                   []? Bactroban/Mupirocin                       [x]? Other: silvercel  [x]? Cover and Secure with:                   [x]? Gauze        [x]? Ree Kalia           []? Kerlix              []? Foam          []?  Super Absorbant []? ABD                                      []? ACE Wrap            []? Other:               Avoid contact of tape with skin.     [x]? Change dressing:  []? Daily           []? Every Other Day    [x]? Once per week   []? Twice per week              []? Three times per week        []? Do Not Change Dressing    []? Other:                Negative Pressure:           Wound Location:   []? Pressure @  mm/Hg                      []?Continuous  []? Intermittent              []? Black          []? White Foam              []? Bridge:               []? Change vac dressing three times per week     Pressure Relief:  []? When sitting, shift position or do seat lifts every 15 minutes. []? Wheelchair cushion           []? Specialty Bed/Mattress  []? Turn every 2 hours when in bed. Avoid direct pressure on wound site. Limit side lying to 30 degree tilt. Limit HOB elevation to 30 degrees. Edema Control:  Apply:  [x]? Compression Stocking      []? Right Leg     [x]? Left Leg              []? Tubigrip      []? Right Leg Double Layer      []? Left Leg Double Layer                                      []?Right Leg Single Layer       []? Left Leg Single Layer                 [x]? Elevate leg(s) above the level of the heart when sitting. [x]? Avoid prolonged standing in one place.         Compression:  Apply:  [x]? Multilayer Compression Wrap:      [x]? RightLeg      []? Left Leg                                 [x]? Profore            []? Profore Lite             []?Unna                 [x]? Do not get leg(s) with wrap wet. If wraps become too tight call the center or completely remove the wrap. [x]? Elevate leg(s) above the level of the heart when sitting. [x]? Avoid prolonged standing in one place.     Off-Loading:   []? Off-loading when   []? walking       []? in bed         []? sitting  []? Total non-weight bearing  []? Right Leg  []? Left Leg         []?  Assistive Device []? Silvina Kaleb        []? Cane      []? Wheelchair      []? Crutches              []? Surgical shoe    []? Podus Boot(s)   []? Foam Boot(s)  []? Roll About              []? Cast Boot   []? CROW Boot  []? Wedge Shoe  []? Other:     Contact Cast:  Apply:  []? Total Contact Cast Applied in Clinic          []? RightLeg      []? Left Leg              []? Do not get cast wet. Contact center or go to emergency room if there is a foul odor or becomes uncomfortable due to feeling tight or swelling. Do not use objects inside of cast to scratch.                  Dietary:  [x]? Diet as tolerated:   []? Calorie Diabetic Diet:         []? No Added Salt:  []? Increase Protein:   []? Other:              Activity:  [x]? Activity as tolerated:    []? Patient has no activity restrictions       []? Strict Bedrest:          []? Remain off Work:       []? May return to full duty work:                                               []? Return to work with restrictions:      [address] [employer] [address] Information: Should you experience any significant changes in your wound(s) or have questions about your wound care, please contact [address] at [place] [address] 8:00 am - 4:30. If you need help with your wound outside of these hours and cannot wait until we are again available, contact your PCP or go to the hospital emergency room.      PLEASE NOTE: IF YOU ARE UNABLE TO [address] , CONTINUE TO USE THE SUPPLIES YOU HAVE AVAILABLE UNTIL [facility]. IT IS MOST IMPORTANT TO KEEP THE WOUND COVERED AT ALL TIMES.     []? Dr. Deepika Antony   []? Dr. Kathleen Doe  [x]? Dr. Alcon March  []?  Dr. Pranav Dimas

## 2021-06-17 NOTE — WOUND CARE
Multilayer Compression Wrap   (Not Unna) Below the Knee    NAME:  Andriy Husain  YOB: 1975  MEDICAL RECORD NUMBER:  [id number]  DATE:  6/17/2021    Applied moisturizing agent to dry skin as needed. Applied primary and secondary dressing as ordered. Applied multilayered dressing below the knee to right lower leg. Instructed patient/caregiver not to remove dressing and to keep it clean and dry. Instructed patient/caregiver on complications to report to provider, such as pain, numbness in toes, heavy drainage, and slippage of dressing. Instructed patient on purpose of compression dressing and on activity and exercise recommendations.     Response to treatment: Well tolerated by patient       Electronically signed by Be Sanchez RN on 6/17/2021 at 10:24 AM

## 2021-06-24 ENCOUNTER — HOSPITAL ENCOUNTER (OUTPATIENT)
Dept: WOUND CARE | Age: 46
Discharge: HOME | End: 2021-06-24
Admitting: ORTHOPAEDIC SURGERY
Payer: MEDICAID

## 2021-06-24 VITALS
TEMPERATURE: 99.1 F | DIASTOLIC BLOOD PRESSURE: 75 MMHG | SYSTOLIC BLOOD PRESSURE: 120 MMHG | RESPIRATION RATE: 18 BRPM | HEART RATE: 88 BPM

## 2021-06-24 PROCEDURE — 29581 APPL MULTLAYER CMPRN SYS LEG: CPT

## 2021-06-24 NOTE — WOUND CARE
Multilayer Compression Wrap   (Not Unna) Below the Knee    NAME:  Billy Peacock  YOB: 1975  MEDICAL RECORD NUMBER:  [id number]  DATE:  6/24/2021    Applied moisturizing agent to dry skin as needed. Applied primary and secondary dressing as ordered. Applied multilayered dressing below the knee to right lower leg. Instructed patient/caregiver not to remove dressing and to keep it clean and dry. Instructed patient/caregiver on complications to report to provider, such as pain, numbness in toes, heavy drainage, and slippage of dressing. Instructed patient on purpose of compression dressing and on activity and exercise recommendations.     Response to treatment: Well tolerated by patient       Electronically signed by Reinaldo Vargas RN on 6/24/2021 at 11:30 AM

## 2021-07-01 ENCOUNTER — HOSPITAL ENCOUNTER (OUTPATIENT)
Dept: WOUND CARE | Age: 46
Discharge: HOME | End: 2021-07-01
Payer: MEDICAID

## 2021-07-01 VITALS
SYSTOLIC BLOOD PRESSURE: 119 MMHG | HEART RATE: 85 BPM | DIASTOLIC BLOOD PRESSURE: 77 MMHG | TEMPERATURE: 97.6 F | RESPIRATION RATE: 18 BRPM

## 2021-07-01 PROCEDURE — 11042 DBRDMT SUBQ TIS 1ST 20SQCM/<: CPT

## 2021-07-01 PROCEDURE — 74011000250 HC RX REV CODE- 250: Performed by: ORTHOPAEDIC SURGERY

## 2021-07-01 RX ORDER — LIDOCAINE HYDROCHLORIDE 20 MG/ML
15 SOLUTION OROPHARYNGEAL AS NEEDED
Status: DISCONTINUED | OUTPATIENT
Start: 2021-07-01 | End: 2021-07-03 | Stop reason: HOSPADM

## 2021-07-01 NOTE — WOUND CARE
[address]   Progress Note and Procedure Note     [address] RECORD NUMBER:  [id number]  AGE: 40 y.o. RACE WHITE/NON-  GENDER: female  : 1975  EPISODE DATE:  2021      Subjective:     Chief Complaint   Patient presents with    Wound Check     right leg         HISTORY of PRESENT ILLNESS HPI    Marcio Mark is a 40 y.o. female who presents today for wound/ulcer evaluation.    History of Wound Context: Started having wound on the right leg for the last 3 to 4 months, worse in the last 3 to 4 weeks, pain in the posterior calf is present aching type, she has had previous ulcerations, she has swelling of both the legs, the right is worse than the left, the left does not breakdown but the right is, she has had multiple venous ablations without any significant relief, she has gained weight over the years and has decreased exercise and has led to worsening of the swelling in both the legs, denies any diabetes, she is a smoker, she does not take any medications notes some  Wound/Ulcer Pain Timing/Severity: None   Quality of pain: none  Severity:  0/ 10   Modifying Factors: None  Associated Signs/Symptoms: edema          PAST MEDICAL HISTORY    Past Medical History:   Diagnosis Date    Anxiety     Condyloma     H/O seasonal allergies     Leg ulcer, left ([address] 2019    treated at wound care center [facility]     Morbid obesity ([facility] [address]     Psychiatric disorder     anxiety    Trichomonal vulvovaginitis     Uterine polyp     Venous insufficiency 2020    diagnosed by Dr. Deb Che    Past Surgical History:   Procedure Laterality Date    HX  SECTION      [address] [place] [address] AND CURETTAGE      D&E for anecephale    HX GI      HX GYN  2013    hysteroscopy with polypectomy and ablation    HX HYSTEROSCOPY WITH ENDOMETRIAL ABLATION      HX ORTHOPAEDIC Bilateral 2005    ingrown toenail ablation       FAMILY HISTORY    Family History   Problem Relation Age of Onset    Nural tube defect Child        SOCIAL HISTORY    Social History     Tobacco Use    Smoking status: Current Every Day Smoker     Packs/day: 0.50     Years: 20.00     Pack years: 10.00    Smokeless tobacco: Never Used   Vaping Use    Vaping Use: Never used   Substance Use Topics    Alcohol use: No    Drug use: No       ALLERGIES    Allergies   Allergen Reactions    Wellbutrin [Bupropion Hcl] Hives       MEDICATIONS    Current Outpatient Medications on File Prior to Encounter   Medication Sig Dispense Refill    naproxen sodium (Aleve) 220 mg tablet Take 220 mg by mouth three (3) times daily (with meals). PRN       No current facility-administered medications on file prior to encounter. REVIEW OF SYSTEMS    A comprehensive review of systems was negative except for that written in the HPI. Objective:     Visit Vitals  /77 (BP 1 Location: Right upper arm, BP Patient Position: At rest;Sitting)   Pulse 85   Temp 97.6 °F (36.4 °C)   Resp 18       Wt Readings from Last 3 Encounters:   06/03/21 131.5 kg (290 lb)   03/06/20 138.5 kg (305 lb 4 oz)   02/28/20 138.5 kg (305 lb 4 oz)       PHYSICAL EXAM    Constitutional: Patient is awake, ambulating with no devices , no acute distress  Head: normocephalic, atraumatic. Behavioral/Psych: patient is pleasant, cooperative, awake and alert    Debridement Wound Care        Problem List Items Addressed This Visit     None          Procedure Note  Indications:  Based on my examination of this patient's wound(s)/ulcer(s) today, debridement is required to promote healing and evaluate the wound base.     Performed by: Bert Perez MD    Consent obtained: yes  Time out taken: yes  Debridement: excisional    Using curette/scalpel the wound(s)/ulcer(s) was/were sharply debrided down through and including the removal of    subcutaneous tissue    Devitalized Tissue Debrided: slough    Pre Debridement Measurements:  Are located in the [facility]  Documentation Flow Sheet      Wound/Ulcer #: 1    Post Debridement Measurements:  Wound/Ulcer Descriptions are Pre Debridement except measurements:    Wound Leg lower Right;Medial #1 (Active)   Wound Image   07/01/21 0932   Wound Etiology Venous 07/01/21 0932   Dressing Status Clean;Dry; Intact 07/01/21 0932   Cleansed Cleansed with saline 07/01/21 0932   Offloading for Diabetic Foot Ulcers No offloading required 07/01/21 0932   Wound Length (cm) 3 cm 07/01/21 0932   Wound Width (cm) 6 cm 07/01/21 0932   Wound Depth (cm) 0.1 cm 07/01/21 0932   Wound Surface Area (cm^2) 18 cm^2 07/01/21 0932   Change in Wound Size % -1400 07/01/21 0932   Wound Volume (cm^3) 1.8 cm^3 07/01/21 0932   Wound Healing % -275 07/01/21 0932   Post-Procedure Length (cm) 3.2 cm 07/01/21 0932   Post-Procedure Width (cm) 6.2 cm 07/01/21 0932   Post-Procedure Depth (cm) 0.3 cm 07/01/21 0932   Post-Procedure Surface Area (cm^2) 19.84 cm^2 07/01/21 0932   Post-Procedure Volume (cm^3) 5.952 cm^3 07/01/21 0932   Wound Assessment Slough;Pink/red;Granulation tissue 07/01/21 0932   Drainage Amount Moderate 07/01/21 0932   Drainage Description Serosanguinous 07/01/21 0932   Wound Odor None 07/01/21 0932   Rosalva-Wound/Incision Assessment Intact 07/01/21 0932   Edges Attached edges 07/01/21 0932   Wound Thickness Description Full thickness 07/01/21 0932   Number of days: 28        Total Surface Area Debrided:  4 sq cm     Estimated Blood Loss:  Minimal     Hemostasis Achieved: with pressure    Procedural Pain: 0/ 10     Post Procedural Pain: 0/ 10     Response to treatment: Well-tolerated by the patient        Assessment:      Problem List Items Addressed This Visit     None      Peripheral venous insufficiency right leg  Ulcer posterior calf right leg  Stasis dermatitis bilateral lower extremities, morbid obesity, tobacco dependence        Plan:     Treatment Note please see attached Discharge InstructionsReturn Appointment:  [x]? Dressing supply provider:Jeanne  []? Home Healthcare:  [x]? Return Appointment: 1 Week(s)  []? Nurse Visit: 1   Week(s)     []? Discharge from [facility]  [x]? Ordered tests: Venous Reflux and PVR keep appointment for 7/2/2021  []? Referrals:   []? Rx:      Wound Cleansing:   Do not scrub or use excessive force. Cleanse wound prior to applying a clean dressing with:  [x]? Normal Saline          [x]? Mild Soap & Water    []? Keep Wound Dry in Shower  []? Other:       Topical Treatments:  Do not apply lotions, creams, or ointments to wound bed unless directed. [x]? Apply moisturizing lotion to skin surrounding the wound prior to dressing change. []? Apply antifungal ointment to skin surrounding the wound prior to dressing change. []? Apply thin film of moisture barrier ointment to skin immediately around wound. []? Other:                   Dressings:                  Wound Location right leg     []? Apply Primary Dressing:                                          []? MediHoney Gel         []? MediHoney Alginate                     []? Calcium Alginate      []? Calcium Alginate with Silver              []? Collagen                   []? Collagen with Silver                []? Santyl with Moistened saline gauze              []? Hydrofera Blue (cut to size and moistened)  []? Hydrofera Blue Ready (Cut to size)              []? NS wet to dry            []? Betadine wet to dry              []? Hydrogel                   []? Mepitel                   []? Bactroban/Mupirocin                       [x]? Other: silvercel     [x]? Cover and Secure with:                   [x]? Gauze        [x]? Enrigue Moose           []? Kerlix              []? Foam          []? Super Absorbant    []? ABD                                      []? ACE Wrap            []? Other:               Avoid contact of tape with skin.     [x]? Change dressing:  []? Daily           [x]? Every Other Day    []? Once per week   []?  Twice per week []? Three times per week        []? Do Not Change Dressing    []? Other:                Negative Pressure:           Wound Location:   []? Pressure @  mm/Hg                      []?Continuous  []? Intermittent              []? Black          []? White Foam              []? Bridge:               []? Change vac dressing three times per week     Pressure Relief:  []? When sitting, shift position or do seat lifts every 15 minutes. []? Wheelchair cushion           []? Specialty Bed/Mattress  []? Turn every 2 hours when in bed. Avoid direct pressure on wound site. Limit side lying to 30 degree tilt. Limit HOB elevation to 30 degrees. Edema Control:  Apply:  [x]? Compression Stocking      [x]? Right Leg     [x]? Left Leg              []? Tubigrip      []? Right Leg Double Layer      []? Left Leg Double Layer                                      []?Right Leg Single Layer       []? Left Leg Single Layer                 [x]? Elevate leg(s) above the level of the heart when sitting. [x]? Avoid prolonged standing in one place.         Compression:  Apply:  []? Multilayer Compression Wrap:      [x]? RightLeg      []? Left Leg                                 []?Profore            []? Profore Lite             []?Unna                 [x]? Do not get leg(s) with wrap wet. If wraps become too tight call the center or completely remove the wrap. [x]? Elevate leg(s) above the level of the heart when sitting. [x]? Avoid prolonged standing in one place.     Off-Loading:   []? Off-loading when   []? walking       []? in bed         []? sitting  []? Total non-weight bearing  []? Right Leg  []? Left Leg         []? Assistive Device    []? Ashlie Flirt        []? Cane      []? Wheelchair      []? Crutches              []? Surgical shoe    []? Podus Boot(s)   []? Foam Boot(s)  []? Roll About              []? Cast Boot   []? CROW Boot  []? Wedge Shoe  []?  Other:     Contact Cast:  Apply:  []? Total Contact Cast Applied in Clinic          []? RightLeg      []? Left Leg              []? Do not get cast wet. Contact center or go to emergency room if there is a foul odor or becomes uncomfortable due to feeling tight or swelling. Do not use objects inside of cast to scratch.                  Dietary:  [x]? Diet as tolerated:   []? Calorie Diabetic Diet:         []? No Added Salt:  []? Increase Protein:   []? Other:              Activity:  [x]? Activity as tolerated:    []? Patient has no activity restrictions       []? Strict Bedrest:          []? Remain off Work:       []? May return to full duty work:                                               []? Return to work with restrictions:      [address] Information: Should you experience any significant changes in your wound(s) or have questions about your wound care, please contact Gala Cancino at [place] [phone] 8:00 am - 4:30. If you need help with your wound outside of these hours and cannot wait until we are again available, contact your PCP or go to the hospital emergency room.      PLEASE NOTE: IF YOU ARE UNABLE TO [address], CONTINUE TO USE THE SUPPLIES YOU HAVE AVAILABLE UNTIL YOU ARE ABLE TO [facility]. IT IS MOST IMPORTANT TO KEEP THE WOUND COVERED AT ALL TIMES. Written patient dismissal instructions given to patient or POA.          Electronically signed by Nasrin Rdz MD on 7/1/2021 at 10:27 AM

## 2021-07-01 NOTE — WOUND CARE
[address]   Progress Note and Procedure Note     [address] RECORD NUMBER:  [id number]  AGE: 40 y.o. RACE WHITE/NON-  GENDER: female  : 1975  EPISODE DATE:  2021      Subjective:     Chief Complaint   Patient presents with    Wound Check     right leg         HISTORY of PRESENT ILLNESS HPI    Juan Antonio Petit is a 40 y.o. female who presents today for wound/ulcer evaluation.    History of Wound Context: Started having wound on the right leg for the last 3 to 4 months, worse in the last 3 to 4 weeks, pain in the posterior calf is present aching type, she has had previous ulcerations, she has swelling of both the legs, the right is worse than the left, the left does not breakdown but the right is, she has had multiple venous ablations without any significant relief, she has gained weight over the years and has decreased exercise and has led to worsening of the swelling in both the legs, denies any diabetes, she is a smoker, she does not take any medications notes some  Wound/Ulcer Pain Timing/Severity: None   Quality of pain: none  Severity:  0/ 10   Modifying Factors: None  Associated Signs/Symptoms: edema          PAST MEDICAL HISTORY    Past Medical History:   Diagnosis Date    Anxiety     Condyloma     H/O seasonal allergies     Leg ulcer, left ([address] 2019    treated at wound care center [facility]     Mor[facility] [address]     Psychiatric disorder     anxiety    Trichomonal vulvovaginitis     Uterine polyp     Venous insufficiency 2020    diagnosed by Dr. Luli Camarillo    Past Surgical History:   Procedure Laterality Date    HX  SECTION      [address] [place] [address] AND CURETTAGE      D&E for anecephale    HX GI      HX GYN  2013    hysteroscopy with polypectomy and ablation    HX HYSTEROSCOPY WITH ENDOMETRIAL ABLATION      HX ORTHOPAEDIC Bilateral 2005    ingrown toenail ablation       FAMILY HISTORY    Family History   Problem Relation Age of Onset    Nural tube defect Child        SOCIAL HISTORY    Social History     Tobacco Use    Smoking status: Current Every Day Smoker     Packs/day: 0.50     Years: 20.00     Pack years: 10.00    Smokeless tobacco: Never Used   Vaping Use    Vaping Use: Never used   Substance Use Topics    Alcohol use: No    Drug use: No       ALLERGIES    Allergies   Allergen Reactions    Wellbutrin [Bupropion Hcl] Hives       MEDICATIONS    Current Outpatient Medications on File Prior to Encounter   Medication Sig Dispense Refill    naproxen sodium (Aleve) 220 mg tablet Take 220 mg by mouth three (3) times daily (with meals). PRN       No current facility-administered medications on file prior to encounter. REVIEW OF SYSTEMS    A comprehensive review of systems was negative except for that written in the HPI. Objective:     Visit Vitals  /77 (BP 1 Location: Right upper arm, BP Patient Position: At rest;Sitting)   Pulse 85   Temp 97.6 °F (36.4 °C)   Resp 18       Wt Readings from Last 3 Encounters:   06/03/21 131.5 kg (290 lb)   03/06/20 138.5 kg (305 lb 4 oz)   02/28/20 138.5 kg (305 lb 4 oz)       PHYSICAL EXAM    Constitutional: Patient is awake, ambulating with no devices , no acute distress  Head: normocephalic, atraumatic. Behavioral/Psych: patient is pleasant, cooperative, awake and alert    Debridement Wound Care        Problem List Items Addressed This Visit     None          Procedure Note  Indications:  Based on my examination of this patient's wound(s)/ulcer(s) today, debridement is required to promote healing and evaluate the wound base.     Performed by: Marina Thompson MD    Consent obtained: yes  Time out taken: yes  Debridement: excisional    Using curette/scalpel the wound(s)/ulcer(s) was/were sharply debrided down through and including the removal of    subcutaneous tissue    Devitalized Tissue Debrided: slough    Pre Debridement Measurements:  Are located in the [facility]  Documentation Flow Sheet      Wound/Ulcer #: 1    Post Debridement Measurements:  Wound/Ulcer Descriptions are Pre Debridement except measurements:    Wound Leg lower Right;Medial #1 (Active)   Wound Image   07/01/21 0932   Wound Etiology Venous 07/01/21 0932   Dressing Status Clean;Dry; Intact 07/01/21 0932   Cleansed Cleansed with saline 07/01/21 0932   Offloading for Diabetic Foot Ulcers No offloading required 07/01/21 0932   Wound Length (cm) 3 cm 07/01/21 0932   Wound Width (cm) 6 cm 07/01/21 0932   Wound Depth (cm) 0.1 cm 07/01/21 0932   Wound Surface Area (cm^2) 18 cm^2 07/01/21 0932   Change in Wound Size % -1400 07/01/21 0932   Wound Volume (cm^3) 1.8 cm^3 07/01/21 0932   Wound Healing % -275 07/01/21 0932   Post-Procedure Length (cm) 3.2 cm 07/01/21 0932   Post-Procedure Width (cm) 6.2 cm 07/01/21 0932   Post-Procedure Depth (cm) 0.3 cm 07/01/21 0932   Post-Procedure Surface Area (cm^2) 19.84 cm^2 07/01/21 0932   Post-Procedure Volume (cm^3) 5.952 cm^3 07/01/21 0932   Wound Assessment Slough;Pink/red;Granulation tissue 07/01/21 0932   Drainage Amount Moderate 07/01/21 0932   Drainage Description Serosanguinous 07/01/21 0932   Wound Odor None 07/01/21 0932   Rosalva-Wound/Incision Assessment Intact 07/01/21 0932   Edges Attached edges 07/01/21 0932   Wound Thickness Description Full thickness 07/01/21 0932   Number of days: 28        Total Surface Area Debrided:  15 sq cm     Estimated Blood Loss:  Minimal     Hemostasis Achieved: with pressure    Procedural Pain: 0/ 10     Post Procedural Pain: 0/ 10     Response to treatment: Well-tolerated by the patient        Assessment:      Problem List Items Addressed This Visit     None        Peripheral venous insufficiency right leg  Ulcer posterior calf right leg  Stasis dermatitis bilateral lower extremities, morbid obesity, tobacco dependence      Plan:     Treatment Note please see attached Discharge Instructions patient is getting test done, she has a fair amount of drainage so will choose to Tubigrip dressings, follow-up after the tests  Return Appointment:  [x]? Dressing supply provider:Jeanne  []? Home Healthcare:  [x]? Return Appointment: 1 Week(s)  []? Nurse Visit: 1   Week(s)     []? [facility]charge from [facility]  [x]? Ordered tests: Venous Reflux and PVR keep appointment for 7/2/2021  []? Referrals:   []? Rx:      Wound Cleansing:   Do not scrub or use excessive force. Cleanse wound prior to applying a clean dressing with:  [x]? Normal Saline          [x]? Mild Soap & Water    []? Keep Wound Dry in Shower  []? Other:       Topical Treatments:  Do not apply lotions, creams, or ointments to wound bed unless directed. [x]? Apply moisturizing lotion to skin surrounding the wound prior to dressing change. []? Apply antifungal ointment to skin surrounding the wound prior to dressing change. []? Apply thin film of moisture barrier ointment to skin immediately around wound. []? Other:                   Dressings:                  Wound Location right leg     []? Apply Primary Dressing:                                          []? MediHoney Gel         []? MediHoney Alginate                     []? Calcium Alginate      []? Calcium Alginate with Silver              []? Collagen                   []? Collagen with Silver                []? Santyl with Moistened saline gauze              []? Hydrofera Blue (cut to size and moistened)  []? Hydrofera Blue Ready (Cut to size)              []? NS wet to dry            []? Betadine wet to dry              []? Hydrogel                   []? Mepitel                   []? Bactroban/Mupirocin                       [x]? Other: silvercel     [x]? Cover and Secure with:                   [x]? Gauze        [x]? Pamela Rook           []? Kerlix              []? Foam          []? Super Absorbant    []? ABD                                      []? ACE Wrap            []?  Other:               Avoid contact of tape with skin.     [x]? Change dressing:  []? Daily           [x]? Every Other Day    []? Once per week   []? Twice per week              []? Three times per week        []? Do Not Change Dressing    []? Other:                Negative Pressure:           Wound Location:   []? Pressure @  mm/Hg                      []?Continuous  []? Intermittent              []? Black          []? White Foam              []? Bridge:               []? Change vac dressing three times per week     Pressure Relief:  []? When sitting, shift position or do seat lifts every 15 minutes. []? Wheelchair cushion           []? Specialty Bed/Mattress  []? Turn every 2 hours when in bed. Avoid direct pressure on wound site. Limit side lying to 30 degree tilt. Limit HOB elevation to 30 degrees. Edema Control:  Apply:  [x]? Compression Stocking      [x]? Right Leg     [x]? Left Leg              []? Tubigrip      []? Right Leg Double Layer      []? Left Leg Double Layer                                      []?Right Leg Single Layer       []? Left Leg Single Layer                 [x]? Elevate leg(s) above the level of the heart when sitting. [x]? Avoid prolonged standing in one place.         Compression:  Apply:  []? Multilayer Compression Wrap:      [x]? RightLeg      []? Left Leg                                 []?Profore            []? Profore Lite             []?Unna                 [x]? Do not get leg(s) with wrap wet. If wraps become too tight call the center or completely remove the wrap. [x]? Elevate leg(s) above the level of the heart when sitting. [x]? Avoid prolonged standing in one place.     Off-Loading:   []? Off-loading when   []? walking       []? in bed         []? sitting  []? Total non-weight bearing  []? Right Leg  []? Left Leg         []? Assistive Device    []? Gorge Hoff        []? Cane      []? Wheelchair      []? Crutches              []? Surgical shoe    []?  Podus Boot(s)   []? Foam Boot(s)  []? Roll About              []? Cast Boot   []? CROW Boot  []? Wedge Shoe  []? Other:     Contact Cast:  Apply:  []? Total Contact Cast Applied in Clinic          []? RightLeg      []? Left Leg              []? Do not get cast wet. Contact center or go to emergency room if there is a foul odor or becomes uncomfortable due to feeling tight or swelling. Do not use objects inside of cast to scratch.                  Dietary:  [x]? Diet as tolerated:   []? Calorie Diabetic Diet:         []? No Added Salt:  []? Increase Protein:   []? Other:              Activity:  [x]? Activity as tolerated:    []? Patient has no activity restrictions       []? Strict Bedrest:          []? Remain off Work:       []? May return to full duty work:                                               []? Return to work with restrictions:      [address] Information: Should you experience any significant changes in your wound(s) or have questions about your wound care, please contact Gala [address] at [place] [phone] 8:00 am - 4:30. If you need help with your wound outside of these hours and cannot wait until we are again available, contact your PCP or go to the hospital emergency room.      PLEASE NOTE: IF YOU ARE UNABLE TO [address] , CONTINUE TO USE THE SUPPLIES YOU HAVE AVAILABLE UNTIL YOU ARE ABLE TO [facility]. IT IS MOST IMPORTANT TO KEEP THE WOUND COVERED AT ALL TIMES. Written patient dismissal instructions given to patient or POA.          Electronically signed by Alcira Gunderson MD on 7/1/2021 at 10:48 AM

## 2021-07-01 NOTE — WOUND CARE
[address] [facility],3Rd Floor:     [address] f: [phone] f: [phone] p: [phone] [email or web address]     Ordering Center:     Jama [address]  [address] [place] [address]  [address] [id number] [phone]  WOUND CARE [unfilled]   FAX NUMBER [unfilled]    Patient Information:      Caesar Burleson [address]   [unfilled]   : 1975  AGE: 40 y.o. GENDER: female   TODAYS DATE:  2021    Insurance:      PRIMARY INSURANCE:  [id number] - (Medicaid)    Payor/Plan Subscr  Sex Relation Sub. Ins. ID Effective Group Num   1. BLUE CROSS MEJeris Bridge 1975 Female Self [id number] [id number]                                   [address] [address]   2. [address] [place] [address],  [address] R 1975 Female Self [id number]                                   [address] [address]       Patient Wound Information:      Problem List Items Addressed This Visit     None          WOUNDS REQUIRING DRESSING SUPPLIES:     Wound Leg lower Right;Medial #1 (Active)   Wound Image   21 09   Wound Etiology Venous 2132   Dressing Status Clean;Dry; Intact 21 0932   Cleansed Cleansed with saline 2132   Offloading for Diabetic Foot Ulcers No offloading required 21 0932   Wound Length (cm) 3 cm 2132   Wound Width (cm) 6 cm 21 0932   Wound Depth (cm) 0.1 cm 2132   Wound Surface Area (cm^2) 18 cm^2 21 0932   Change in Wound Size % -1400 21 09   Wound Volume (cm^3) 1.8 cm^3 21 0932   Wound Healing % -275 21 0932   Post-Procedure Length (cm) 3.2 cm 21 0932   Post-Procedure Width (cm) 6.2 cm 21   Post-Procedure Depth (cm) 0.3 cm 21   Post-Procedure Surface Area (cm^2) 19.84 cm^2 21   Post-Procedure Volume (cm^3) 5.952 cm^3 21   Wound Assessment Slough;Pink/red;Granulation tissue 21 Drainage Amount Moderate 07/01/21 0932   Drainage Description Serosanguinous 07/01/21 0932   Wound Odor None 07/01/21 0932   Rosalva-Wound/Incision Assessment Intact 07/01/21 0932   Edges Attached edges 07/01/21 0932   Wound Thickness Description Full thickness 07/01/21 0932   Number of days: 28        Supplies Requested :      WOUND #:1   PRIMARY DRESSING:  Other: silvercel   4X4 gauze pad and Conforming roll gauze     FREQUENCY OF DRESSING CHANGES:  Every other day           ADDITIONAL ITEMS:  [] Gloves Small  [x] Gloves Medium [] Gloves Large [] Gloves XLarge  [] Tape 1\" [] Tape 2\" [] Tape 3\"  [x] Medipore Tape  [x] Saline  [] Skin Prep   [] Adhesive Remover   [] Cotton Tip Applicators   [] Other:    Patient Wound(s) Debrided: [x] Yes if yes please add date 07/01/2021       Debribement Type: Excisional/Sharp    Patient currently being seen by Home Health: [] Yes   [x] No    Duration for needed supplies:  []15  [x]30  []60  []90 Days    Electronically signed by Anneliese Ochoa LPN on 9/7/2027 at 15:69 AM    Provider Information:      PROVIDER'S NAME: JEAN Jason MD

## 2021-07-01 NOTE — WOUND CARE
Discharge Instructions for  [address] [place] [address], [address] [place] [address]  Telephone: [phone]    NAME:  Ciaran Lang OF BIRTH:  1975  MEDICAL RECORD NUMBER:  [id number]  DATE:  7/1/2021      Return Appointment:  [x] Dressing supply provider:Jeanne  [] Home Healthcare:  [x] Return Appointment: 1 Week(s)  [] Nurse Visit: 1   Week(s)    [] Discharge from [facility]  [x] Ordered tests: Venous Reflux and PVR keep appointment for 7/2/2021  [] Referrals:   [] Rx:     Wound Cleansing:   Do not scrub or use excessive force. Cleanse wound prior to applying a clean dressing with:  [x] Normal Saline   [x] Mild Soap & Water    [] Keep Wound Dry in Shower  [] Other:      Topical Treatments:  Do not apply lotions, creams, or ointments to wound bed unless directed. [x] Apply moisturizing lotion to skin surrounding the wound prior to dressing change.  [] Apply antifungal ointment to skin surrounding the wound prior to dressing change.  [] Apply thin film of moisture barrier ointment to skin immediately around wound. [] Other:        Dressings:           Wound Location right leg   [] Apply Primary Dressing:       [] MediHoney Gel    [] MediHoney Alginate               [] Calcium Alginate      [] Calcium Alginate with Silver   [] Collagen                   [] Collagen with Silver                [] Santyl with Moistened saline gauze              [] Hydrofera Blue (cut to size and moistened)  [] Hydrofera Blue Ready (Cut to size)   [] NS wet to dry    [] Betadine wet to dry              [] Hydrogel                [] Mepitel     [] Bactroban/Mupirocin               [x] Other: silvercel    [x] Cover and Secure with:     [x] Gauze [x] Tiffanie [] Kerlix   [] Foam [] Super Absorbant [] ABD     [] ACE Wrap            [] Other:    Avoid contact of tape with skin.     [x] Change dressing: [] Daily    [x] Every Other Day [] Once per week   [] Twice per week   [] Three times per week [] Do Not Change Dressing   [] Other:     Negative Pressure:           Wound Location:   [] Pressure @  mm/Hg  []Continuous []Intermittent   [] Black  [] White Foam              [] Bridge:               [] Change vac dressing three times per week    Pressure Relief:  [] When sitting, shift position or do seat lifts every 15 minutes.  [] Wheelchair cushion [] Specialty Bed/Mattress  [] Turn every 2 hours when in bed. Avoid direct pressure on wound site. Limit side lying to 30 degree tilt. Limit HOB elevation to 30 degrees. Edema Control:  Apply: [x] Compression Stocking [x]Right Leg [x]Left Leg   [] Tubigrip []Right Leg Double Layer []Left Leg Double Layer      []Right Leg Single Layer []Left Leg Single Layer     [x] Elevate leg(s) above the level of the heart when sitting. [x] Avoid prolonged standing in one place. Compression:  Apply: [] Multilayer Compression Wrap: [x]RightLeg []Left Leg                                 []Profore  []Profore Lite             []Unna     [x] Do not get leg(s) with wrap wet. If wraps become too tight call the center or completely remove the wrap. [x] Elevate leg(s) above the level of the heart when sitting. [x] Avoid prolonged standing in one place. Off-Loading:   [] Off-loading when [] walking  [] in bed [] sitting  [] Total non-weight bearing  [] Right Leg  [] Left Leg   [] Assistive Device [] Walker [] Cane      [] Wheelchair  [] Crutches   [] Surgical shoe    [] Podus Boot(s)   [] Foam Boot(s)  [] Roll About    [] Cast Boot [] CROW Boot  [] Wedge Shoe  [] Other:    Contact Cast:  Apply: [] Total Contact Cast Applied in Clinic []RightLeg []Left Leg   [] Do not get cast wet. Contact center or go to emergency room if there is a foul odor or becomes uncomfortable due to feeling tight or swelling. Do not use objects inside of cast to scratch.       Dietary:  [x] Diet as tolerated: [] Calorie Diabetic Diet: [] No Added Salt:  [] Increase Protein: [] Other:     Activity:  [x] Activity as tolerated:    [] Patient has no activity restrictions       [] Strict Bedrest:   [] Remain off Work:       [] May return to full duty work:                                     [] Return to work with restrictions:                    [address] [facility] [address] Information: Should you experience any significant changes in your wound(s) or have questions about your wound care, please contact Gala Cancino at [place] [address] 8:00 am - 4:30. If you need help with your wound outside of these hours and cannot wait until we are again available, contact your PCP or go to the hospital emergency room. PLEASE NOTE: IF YOU ARE UNABLE TO OBTAIN WOUND SUPPLIES, CONTINUE TO USE THE SUPPLIES YOU HAVE AVAILABLE UNTIL YOU ARE ABLE TO REACH US. IT IS MOST IMPORTANT TO KEEP THE WOUND COVERED AT ALL TIMES.     [] Dr. Blanco Walker   [] Dr. Frances Best  [x] Dr. Jaycee Burciaga  [] Dr. Freedom Finley

## 2021-07-01 NOTE — DISCHARGE INSTRUCTIONS
Discharge Instructions for  [address] [place] [address], [address] [place] [address]  Telephone: [phone]     NAME:  Sadie Soriano OF BIRTH:  1975  MEDICAL RECORD NUMBER:  [id number]  DATE:  7/1/2021        Return Appointment:  [x]? Dressing supply provider:Jeanne  []? Home Healthcare:  [x]? Return Appointment: 1 Week(s)  []? Nurse Visit: 1   Week(s)     []? Discharge from [facility]  [x]? Ordered tests: Venous Reflux and PVR keep appointment for 7/2/2021  []? Referrals:   []? Rx:      Wound Cleansing:   Do not scrub or use excessive force. Cleanse wound prior to applying a clean dressing with:  [x]? Normal Saline          [x]? Mild Soap & Water    []? Keep Wound Dry in Shower  []? Other:       Topical Treatments:  Do not apply lotions, creams, or ointments to wound bed unless directed. [x]? Apply moisturizing lotion to skin surrounding the wound prior to dressing change. []? Apply antifungal ointment to skin surrounding the wound prior to dressing change. []? Apply thin film of moisture barrier ointment to skin immediately around wound. []? Other:                   Dressings:                  Wound Location right leg     []? Apply Primary Dressing:                                          []? MediHoney Gel         []? MediHoney Alginate                     []? Calcium Alginate      []? Calcium Alginate with Silver              []? Collagen                   []? Collagen with Silver                []? Santyl with Moistened saline gauze              []? Hydrofera Blue (cut to size and moistened)  []? Hydrofera Blue Ready (Cut to size)              []? NS wet to dry            []? Betadine wet to dry              []? Hydrogel                   []? Mepitel                   []? Bactroban/Mupirocin                       [x]? Other: silvercel     [x]? Cover and Secure with:                   [x]? Gauze        [x]? Zollikeyur Riddle           []?  Kerlix []? Foam          []? Super Absorbant    []? ABD                                      []? ACE Wrap            []? Other:               Avoid contact of tape with skin.     [x]? Change dressing:  []? Daily           [x]? Every Other Day    []? Once per week   []? Twice per week              []? Three times per week        []? Do Not Change Dressing    []? Other:                Negative Pressure:           Wound Location:   []? Pressure @  mm/Hg                      []?Continuous  []? Intermittent              []? Black          []? White Foam              []? Bridge:               []? Change vac dressing three times per week     Pressure Relief:  []? When sitting, shift position or do seat lifts every 15 minutes. []? Wheelchair cushion           []? Specialty Bed/Mattress  []? Turn every 2 hours when in bed. Avoid direct pressure on wound site. Limit side lying to 30 degree tilt. Limit HOB elevation to 30 degrees. Edema Control:  Apply:  [x]? Compression Stocking      [x]? Right Leg     [x]? Left Leg              []? Tubigrip      []? Right Leg Double Layer      []? Left Leg Double Layer                                      []?Right Leg Single Layer       []? Left Leg Single Layer                 [x]? Elevate leg(s) above the level of the heart when sitting. [x]? Avoid prolonged standing in one place.         Compression:  Apply:  []? Multilayer Compression Wrap:      [x]? RightLeg      []? Left Leg                                 []?Profore            []? Profore Lite             []?Unna                 [x]? Do not get leg(s) with wrap wet. If wraps become too tight call the center or completely remove the wrap. [x]? Elevate leg(s) above the level of the heart when sitting. [x]? Avoid prolonged standing in one place.     Off-Loading:   []? Off-loading when   []? walking       []? in bed         []? sitting  []? Total non-weight bearing  []? Right Leg  []? Left Leg         []? Assistive Device    []? Velta Aver        []? Cane      []? Wheelchair      []? Crutches              []? Surgical shoe    []? Podus Boot(s)   []? Foam Boot(s)  []? Roll About              []? Cast Boot   []? CROW Boot  []? Wedge Shoe  []? Other:     Contact Cast:  Apply:  []? Total Contact Cast Applied in Clinic          []? RightLeg      []? Left Leg              []? Do not get cast wet. Contact center or go to emergency room if there is a foul odor or becomes uncomfortable due to feeling tight or swelling. Do not use objects inside of cast to scratch.                  Dietary:  [x]? Diet as tolerated:   []? Calorie Diabetic Diet:         []? No Added Salt:  []? Increase Protein:   []? Other:              Activity:  [x]? Activity as tolerated:    []? Patient has no activity restrictions       []? Strict Bedrest:          []? Remain off Work:       []? May return to full duty work:                                               []? Return to work with restrictions:      [address] Information: Should you experience any significant changes in your wound(s) or have questions about your wound care, please contact Gala Negron 26 at [place] [address] 8:00 am - 4:30. If you need help with your wound outside of these hours and cannot wait until we are again available, contact your PCP or go to the hospital emergency room.      PLEASE NOTE: IF YOU ARE [place] [address], CONTINUE TO USE THE SUPPLIES YOU HAVE AVAILABLE UNTIL YOU ARE ABLE TO [facility]. IT IS MOST IMPORTANT TO KEEP THE WOUND COVERED AT ALL TIMES.     []? Dr. Mattie Gale   []? Dr. Sabra Verdugo  [x]? Dr. Michelina Soulier  []?  Dr. Yeny Henao

## 2021-07-02 ENCOUNTER — HOSPITAL ENCOUNTER (OUTPATIENT)
Dept: NON INVASIVE DIAGNOSTICS | Age: 46
Discharge: HOME | End: 2021-07-02
Attending: ORTHOPAEDIC SURGERY
Payer: MEDICAID

## 2021-07-02 DIAGNOSIS — I73.9 PERIPHERAL VASCULAR DISEASE (HCC): ICD-10-CM

## 2021-07-02 DIAGNOSIS — I87.2 VENOUS (PERIPHERAL) INSUFFICIENCY: ICD-10-CM

## 2021-07-02 PROCEDURE — 93970 EXTREMITY STUDY: CPT

## 2021-07-02 PROCEDURE — 93923 UPR/LXTR ART STDY 3+ LVLS: CPT

## 2021-07-03 PROCEDURE — 93970 EXTREMITY STUDY: CPT | Performed by: SURGERY

## 2021-07-03 PROCEDURE — 93923 UPR/LXTR ART STDY 3+ LVLS: CPT | Performed by: SURGERY

## 2021-07-08 ENCOUNTER — HOSPITAL ENCOUNTER (OUTPATIENT)
Dept: WOUND CARE | Age: 46
Discharge: HOME | End: 2021-07-08
Payer: MEDICAID

## 2021-07-08 VITALS
RESPIRATION RATE: 18 BRPM | SYSTOLIC BLOOD PRESSURE: 144 MMHG | TEMPERATURE: 97.6 F | DIASTOLIC BLOOD PRESSURE: 80 MMHG | HEART RATE: 76 BPM

## 2021-07-08 PROCEDURE — 74011000250 HC RX REV CODE- 250: Performed by: ORTHOPAEDIC SURGERY

## 2021-07-08 PROCEDURE — 11042 DBRDMT SUBQ TIS 1ST 20SQCM/<: CPT

## 2021-07-08 RX ORDER — LIDOCAINE HYDROCHLORIDE 20 MG/ML
15 SOLUTION OROPHARYNGEAL AS NEEDED
Status: DISCONTINUED | OUTPATIENT
Start: 2021-07-08 | End: 2021-07-10 | Stop reason: HOSPADM

## 2021-07-08 NOTE — WOUND CARE
Discharge Instructions for  [address] [place] [address]  Telephone: [phone]    NAME:  Juanita Turner OF BIRTH:  1975  MEDICAL RECORD NUMBER:  [id number]  DATE:  7/8/2021      Return Appointment:  [x] Dressing supply provider:Jeanne  [] Home Healthcare:  [x] Return Appointment: 1 Week(s)  [] Nurse Visit: 1   Week(s)    [] Discharge from [facility]  [] Ordered tests:    [] Referrals:   [] Rx:     Wound Cleansing:   Do not scrub or use excessive force. Cleanse wound prior to applying a clean dressing with:  [x] Normal Saline   [x] Mild Soap & Water    [] Keep Wound Dry in Shower  [] Other:      Topical Treatments:  Do not apply lotions, creams, or ointments to wound bed unless directed. [x] Apply moisturizing lotion to skin surrounding the wound prior to dressing change.  [] Apply antifungal ointment to skin surrounding the wound prior to dressing change.  [] Apply thin film of moisture barrier ointment to skin immediately around wound. [] Other:        Dressings:           Wound Location right leg   [] Apply Primary Dressing:       [] MediHoney Gel    [] MediHoney Alginate               [] Calcium Alginate      [] Calcium Alginate with Silver   [] Collagen                   [] Collagen with Silver                [] Santyl with Moistened saline gauze              [] Hydrofera Blue (cut to size and moistened)  [] Hydrofera Blue Ready (Cut to size)   [] NS wet to dry    [] Betadine wet to dry              [] Hydrogel                [] Mepitel     [] Bactroban/Mupirocin               [x] Other: silvercel    [x] Cover and Secure with:     [x] Gauze [x] Tiffanie [] Kerlix   [] Foam [] Super Absorbant [] ABD     [] ACE Wrap            [] Other:    Avoid contact of tape with skin.     [x] Change dressing: [] Daily    [x] Every Other Day [] Once per week   [] Twice per week   [] Three times per week [] Do Not Change Dressing   [] Other:     Negative Pressure:           Wound Location:   [] Pressure @  mm/Hg  []Continuous []Intermittent   [] Black  [] White Foam              [] Bridge:               [] Change vac dressing three times per week    Pressure Relief:  [] When sitting, shift position or do seat lifts every 15 minutes.  [] Wheelchair cushion [] Specialty Bed/Mattress  [] Turn every 2 hours when in bed. Avoid direct pressure on wound site. Limit side lying to 30 degree tilt. Limit HOB elevation to 30 degrees. Edema Control:  Apply: [x] Compression Stocking [x]Right Leg [x]Left Leg   [] Tubigrip []Right Leg Double Layer []Left Leg Double Layer      []Right Leg Single Layer []Left Leg Single Layer     [x] Elevate leg(s) above the level of the heart when sitting. [x] Avoid prolonged standing in one place. Compression:  Apply: [] Multilayer Compression Wrap: [x]RightLeg []Left Leg                                 []Profore  []Profore Lite             []Unna     [x] Do not get leg(s) with wrap wet. If wraps become too tight call the center or completely remove the wrap. [x] Elevate leg(s) above the level of the heart when sitting. [x] Avoid prolonged standing in one place. Off-Loading:   [] Off-loading when [] walking  [] in bed [] sitting  [] Total non-weight bearing  [] Right Leg  [] Left Leg   [] Assistive Device [] Walker [] Cane      [] Wheelchair  [] Crutches   [] Surgical shoe    [] Podus Boot(s)   [] Foam Boot(s)  [] Roll About    [] Cast Boot [] CROW Boot  [] Wedge Shoe  [] Other:    Contact Cast:  Apply: [] Total Contact Cast Applied in Clinic []RightLeg []Left Leg   [] Do not get cast wet. Contact center or go to emergency room if there is a foul odor or becomes uncomfortable due to feeling tight or swelling. Do not use objects inside of cast to scratch.       Dietary:  [x] Diet as tolerated: [] Calorie Diabetic Diet: [] No Added Salt:  [] Increase Protein: [] Other:     Activity:  [x] Activity as tolerated:    [] Patient has no activity restrictions       [] Strict Bedrest:   [] Remain off Work:       [] May return to full duty work:                                     [] Return to work with restrictions:                    [address] [facility] [facility] [address] Information: Should you experience any significant changes in your wound(s) or have questions about your wound care, please contact Gala Cancino at [place] [address] 8:00 am - 4:30. If you need help with your wound outside of these hours and cannot wait until we are again available, contact your PCP or go to the hospital emergency room. PLEASE NOTE: IF YOU ARE UNABLE TO OBTAIN WOUND SUPPLIES, CONTINUE TO USE THE SUPPLIES YOU HAVE AVAILABLE UNTIL YOU ARE ABLE TO REACH US. IT IS MOST IMPORTANT TO KEEP THE WOUND COVERED AT ALL TIMES.     [] Dr. Brittany Gambino   [] Dr. Russell Allan  [x] Dr. Autumn Baker  [] Dr. Anderson Beebe

## 2021-07-08 NOTE — DISCHARGE INSTRUCTIONS
Discharge Instructions for  [address] [place] [address], [facility]  Telephone: [phone]     NAME:  Lakia Sherman OF BIRTH:  1975  MEDICAL RECORD NUMBER:  [id number]  DATE:  7/8/2021        Return Appointment:  [x]? Dressing supply provider:Jeanne  []? Home Healthcare:  [x]? Return Appointment: 1 Week(s)  []? Nurse Visit: 1   Week(s)     []? Discharge from [facility]  []? Ordered tests:    []? Referrals:   []? Rx:      Wound Cleansing:   Do not scrub or use excessive force. Cleanse wound prior to applying a clean dressing with:  [x]? Normal Saline          [x]? Mild Soap & Water    []? Keep Wound Dry in Shower  []? Other:       Topical Treatments:  Do not apply lotions, creams, or ointments to wound bed unless directed. [x]? Apply moisturizing lotion to skin surrounding the wound prior to dressing change. []? Apply antifungal ointment to skin surrounding the wound prior to dressing change. []? Apply thin film of moisture barrier ointment to skin immediately around wound. []? Other:                   Dressings:                  Wound Location right leg     []? Apply Primary Dressing:                                          []? MediHoney Gel         []? MediHoney Alginate                     []? Calcium Alginate      []? Calcium Alginate with Silver              []? Collagen                   []? Collagen with Silver                []? Santyl with Moistened saline gauze              []? Hydrofera Blue (cut to size and moistened)  []? Hydrofera Blue Ready (Cut to size)              []? NS wet to dry            []? Betadine wet to dry              []? Hydrogel                   []? Mepitel                   []? Bactroban/Mupirocin                       [x]? Other: silvercel     [x]? Cover and Secure with:                   [x]? Gauze        [x]? Katie Ricks           []? Kerlix              []? Foam          []? Super Absorbant    []?  ABD []? ACE Wrap            []? Other:               Avoid contact of tape with skin.     [x]? Change dressing:  []? Daily           [x]? Every Other Day    []? Once per week   []? Twice per week              []? Three times per week        []? Do Not Change Dressing    []? Other:                Negative Pressure:           Wound Location:   []? Pressure @  mm/Hg                      []?Continuous  []? Intermittent              []? Black          []? White Foam              []? Bridge:               []? Change vac dressing three times per week     Pressure Relief:  []? When sitting, shift position or do seat lifts every 15 minutes. []? Wheelchair cushion           []? Specialty Bed/Mattress  []? Turn every 2 hours when in bed. Avoid direct pressure on wound site. Limit side lying to 30 degree tilt. Limit HOB elevation to 30 degrees. Edema Control:  Apply:  [x]? Compression Stocking      [x]? Right Leg     [x]? Left Leg              []? Tubigrip      []? Right Leg Double Layer      []? Left Leg Double Layer                                      []?Right Leg Single Layer       []? Left Leg Single Layer                 [x]? Elevate leg(s) above the level of the heart when sitting. [x]? Avoid prolonged standing in one place.         Compression:  Apply:  []? Multilayer Compression Wrap:      [x]? RightLeg      []? Left Leg                                 []?Profore            []? Profore Lite             []?Unna                 [x]? Do not get leg(s) with wrap wet. If wraps become too tight call the center or completely remove the wrap. [x]? Elevate leg(s) above the level of the heart when sitting. [x]? Avoid prolonged standing in one place.     Off-Loading:   []? Off-loading when   []? walking       []? in bed         []? sitting  []? Total non-weight bearing  []? Right Leg  []? Left Leg         []? Assistive Device    []?  Eduarda Salinas []? [address] [facility]      []? Wheelchair      []? Crutches              []? Surgical shoe    []? Podus Boot(s)   []? Foam Boot(s)  []? Roll About              []? Cast Boot   []? CROW Boot  []? Wedge Shoe  []? Other:     Contact Cast:  Apply:  []? Total Contact Cast Applied in Clinic          []? RightLeg      []? Left Leg              []? Do not get cast wet. Contact center or go to emergency room if there is a foul odor or becomes uncomfortable due to feeling tight or swelling. Do not use objects inside of cast to scratch.                  Dietary:  [x]? Diet as tolerated:   []? Calorie Diabetic Diet:         []? No Added Salt:  []? Increase Protein:   []? Other:              Activity:  [x]? Activity as tolerated:    []? Patient has no activity restrictions       []? Strict Bedrest:          []? Remain off Work:       []? May return to full duty work:                                               []? Return to work with restrictions:      [address] Information: Should you experience any significant changes in your wound(s) or have questions about your wound care, please contact Gala Negron 26 at [place] [address] 8:00 am - 4:30. If you need help with your wound outside of these hours and cannot wait until we are again available, contact your PCP or go to the hospital emergency room.      PLEASE NOTE: [facility], CONTINUE TO USE THE SUPPLIES YOU HAVE AVAILABLE UNTIL YOU ARE ABLE TO [facility]. IT IS MOST IMPORTANT TO KEEP THE WOUND COVERED AT ALL TIMES.     []? Dr. Marbin Casarze   []? Dr. Liam Lynch  [x]? Dr. Justiec Borrego  []?  Dr. Scout Talley

## 2021-07-08 NOTE — WOUND CARE
[address]   Progress Note and Procedure Note     [address] RECORD NUMBER:  [id number]  AGE: 40 y.o. RACE WHITE/NON-  GENDER: female  : 1975  EPISODE DATE:  2021      Subjective:     Chief Complaint   Patient presents with    Wound Check     right leg         HISTORY of PRESENT ILLNESS HPI    Tania Mahmood is a 40 y.o. female who presents today for wound/ulcer evaluation.    History of Wound Context: Started having wound on the right leg for the last 3 to 4 months, worse in the last 3 to 4 weeks, pain in the posterior calf is present aching type, she has had previous ulcerations, she has swelling of both the legs, the right is worse than the left, the left does not breakdown but the right is, she has had multiple venous ablations without any significant relief, she has gained weight over the years and has decreased exercise and has led to worsening of the swelling in both the legs, denies any diabetes, she is a smoker, she does not take any medications, patient doing well, still trying to quit smoking, down to half a pack per day  Wound/Ulcer Pain Timing/Severity: None   Quality of pain: none  Severity:  0/ 10   Modifying Factors: None  Associated Signs/Symptoms: edema          PAST MEDICAL HISTORY    Past Medical History:   Diagnosis Date    Anxiety     Condyloma     H/O seasonal allergies     Leg ulcer, left ([address] 2019    treated at wound care center [facility] Migraines     Morbid [facility] [address] [address]     Psychiatric disorder     anxiety    Trichomonal vulvovaginitis     Uterine polyp     Venous insufficiency 2020    diagnosed by Dr. Gregory Romero    Past Surgical History:   Procedure Laterality Date    HX  SECTION      [address] [place] [address] AND CURETTAGE      D&E for anecephale    HX GI      HX GYN      hysteroscopy with polypectomy and ablation    HX HYSTEROSCOPY WITH ENDOMETRIAL ABLATION      HX ORTHOPAEDIC Bilateral 2005    ingrown toenail ablation       FAMILY HISTORY    Family History   Problem Relation Age of Onset    Nural tube defect Child        SOCIAL HISTORY    Social History     Tobacco Use    Smoking status: Current Every Day Smoker     Packs/day: 0.50     Years: 20.00     Pack years: 10.00    Smokeless tobacco: Never Used   Vaping Use    Vaping Use: Never used   Substance Use Topics    Alcohol use: No    Drug use: No       ALLERGIES    Allergies   Allergen Reactions    Wellbutrin [Bupropion Hcl] Hives       MEDICATIONS    Current Outpatient Medications on File Prior to Encounter   Medication Sig Dispense Refill    naproxen sodium (Aleve) 220 mg tablet Take 220 mg by mouth three (3) times daily (with meals). PRN       No current facility-administered medications on file prior to encounter. REVIEW OF SYSTEMS    A comprehensive review of systems was negative except for that written in the HPI. Objective:     Visit Vitals  BP (!) 144/80 (BP 1 Location: Right upper arm, BP Patient Position: At rest;Sitting)   Pulse 76   Temp 97.6 °F (36.4 °C)   Resp 18       Wt Readings from Last 3 Encounters:   06/03/21 131.5 kg (290 lb)   03/06/20 138.5 kg (305 lb 4 oz)   02/28/20 138.5 kg (305 lb 4 oz)       PHYSICAL EXAM    Constitutional: Patient is awake, ambulating with no devices , no acute distress  Head: normocephalic, atraumatic. Behavioral/Psych: patient is pleasant, cooperative, awake and alert    Debridement Wound Care        Problem List Items Addressed This Visit     None          Procedure Note  Indications:  Based on my examination of this patient's wound(s)/ulcer(s) today, debridement is required to promote healing and evaluate the wound base.     Performed by: Ramila Olivares MD    Consent obtained: yes  Time out taken: yes  Debridement: excisional    Using curette/scalpel the wound(s)/ulcer(s) was/were sharply debrided down through and including the removal of subcutaneous tissue    Devitalized Tissue Debrided: biofilm    Pre Debridement Measurements:  Are located in the Wound/Ulcer Documentation Flow Sheet      Wound/Ulcer #: 1    Post Debridement Measurements:  Wound/Ulcer Descriptions are Pre Debridement except measurements:    Wound Leg lower Right;Medial #1 (Active)   Wound Image   07/08/21 1002   Wound Etiology Venous 07/08/21 1002   Dressing Status Clean;Dry; Intact 07/08/21 1002   Cleansed Cleansed with saline 07/08/21 1002   Offloading for Diabetic Foot Ulcers No offloading required 07/01/21 0932   Wound Length (cm) 2.2 cm 07/08/21 1002   Wound Width (cm) 4.7 cm 07/08/21 1002   Wound Depth (cm) 0.1 cm 07/08/21 1002   Wound Surface Area (cm^2) 10.34 cm^2 07/08/21 1002   Change in Wound Size % -761.67 07/08/21 1002   Wound Volume (cm^3) 1.034 cm^3 07/08/21 1002   Wound Healing % -115 07/08/21 1002   Post-Procedure Length (cm) 2.4 cm 07/08/21 1002   Post-Procedure Width (cm) 4.9 cm 07/08/21 1002   Post-Procedure Depth (cm) 0.2 cm 07/08/21 1002   Post-Procedure Surface Area (cm^2) 11.76 cm^2 07/08/21 1002   Post-Procedure Volume (cm^3) 2.352 cm^3 07/08/21 1002   Wound Assessment Slough;Pink/red;Granulation tissue 07/08/21 1002   Drainage Amount Moderate 07/08/21 1002   Drainage Description Serosanguinous 07/08/21 1002   Wound Odor None 07/08/21 1002   Rosalva-Wound/Incision Assessment Hyperpigmented; Intact 07/08/21 1002   Edges Attached edges 07/08/21 1002   Wound Thickness Description Full thickness 07/08/21 1002   Number of days: 35        Total Surface Area Debrided:  1 sq cm     Estimated Blood Loss:  Minimal     Hemostasis Achieved: with pressure    Procedural Pain: 0/ 10     Post Procedural Pain: 0/ 10     Response to treatment: Well-tolerated by the patient        Assessment:      Problem List Items Addressed This Visit     None        Peripheral venous insufficiency right leg  Ulcer posterior calf right leg  Stasis dermatitis bilateral lower extremities, morbid obesity, tobacco dependence      Plan:     Treatment Note please see attached Discharge Instructions  Patient has had persistent ulceration, this is recurred and persisted over the last 2 years, she has had 3 6 weeks of treatment right now, she has done elevation and use compression, she would benefit from lymphedema pumps to prevent and treat these ulcerations  Return Appointment:  [x]? Dressing supply provider:Jeanne  []? Home Healthcare:  [x]? Return Appointment: 1 Week(s)  []? Nurse Visit: 1   Week(s)     []? Discharge from [facility]  []? Ordered tests:    []? Referrals:   []? Rx:      Wound Cleansing:   Do not scrub or use excessive force. Cleanse wound prior to applying a clean dressing with:  [x]? Normal Saline          [x]? Mild Soap & Water    []? Keep Wound Dry in Shower  []? Other:       Topical Treatments:  Do not apply lotions, creams, or ointments to wound bed unless directed. [x]? Apply moisturizing lotion to skin surrounding the wound prior to dressing change. []? Apply antifungal ointment to skin surrounding the wound prior to dressing change. []? Apply thin film of moisture barrier ointment to skin immediately around wound. []? Other:                   Dressings:                  Wound Location right leg     []? Apply Primary Dressing:                                          []? MediHoney Gel         []? MediHoney Alginate                     []? Calcium Alginate      []? Calcium Alginate with Silver              []? Collagen                   []? Collagen with Silver                []? Santyl with Moistened saline gauze              []? Hydrofera Blue (cut to size and moistened)  []? Hydrofera Blue Ready (Cut to size)              []? NS wet to dry            []? Betadine wet to dry              []? Hydrogel                   []? Mepitel                   []? Bactroban/Mupirocin                       [x]? Other: silvercel     [x]? Cover and Secure with:                   [x]? Gauze        [x]? Tyson Dundee           []? Kerlix              []? Foam          []? Super Absorbant    []? ABD                                      []? ACE Wrap            []? Other:               Avoid contact of tape with skin.     [x]? Change dressing:  []? Daily           [x]? Every Other Day    []? Once per week   []? Twice per week              []? Three times per week        []? Do Not Change Dressing    []? Other:                Negative Pressure:           Wound Location:   []? Pressure @  mm/Hg                      []?Continuous  []? Intermittent              []? Black          []? White Foam              []? Bridge:               []? Change vac dressing three times per week     Pressure Relief:  []? When sitting, shift position or do seat lifts every 15 minutes. []? Wheelchair cushion           []? Specialty Bed/Mattress  []? Turn every 2 hours when in bed. Avoid direct pressure on wound site. Limit side lying to 30 degree tilt. Limit HOB elevation to 30 degrees. Edema Control:  Apply:  [x]? Compression Stocking      [x]? Right Leg     [x]? Left Leg              []? Tubigrip      []? Right Leg Double Layer      []? Left Leg Double Layer                                      []?Right Leg Single Layer       []? Left Leg Single Layer                 [x]? Elevate leg(s) above the level of the heart when sitting. [x]? Avoid prolonged standing in one place.         Compression:  Apply:  []? Multilayer Compression Wrap:      [x]? RightLeg      []? Left Leg                                 []?Profore            []? Profore Lite             []?Unna                 [x]? Do not get leg(s) with wrap wet. If wraps become too tight call the center or completely remove the wrap. [x]? Elevate leg(s) above the level of the heart when sitting. [x]? Avoid prolonged standing in one place.     Off-Loading:   []? Off-loading when   []? walking       []? in bed         []? sitting  []?  Total non-weight bearing  []? Right Leg  []? Left Leg         []? Assistive Device    []? [place]        []? Cane      []? Wheelchair      []? Crutches              []? Surgical shoe    []? Podus Boot(s)   []? Foam Boot(s)  []? Roll About              []? Cast Boot   []? CROW Boot  []? Wedge Shoe  []? Other:     Contact Cast:  Apply:  []? Total Contact Cast Applied in Clinic          []? RightLeg      []? Left Leg              []? Do not get cast wet. Contact center or go to emergency room if there is a foul odor or becomes uncomfortable due to feeling tight or swelling. Do not use objects inside of cast to scratch.                  Dietary:  [x]? Diet as tolerated:   []? Calorie Diabetic Diet:         []? No Added Salt:  []? Increase Protein:   []? Other:              Activity:  [x]? Activity as tolerated:    []? Patient has no activity restrictions       []? Strict Bedrest:          []? Remain off Work:       []? May return to full duty work:                                               []? Return to work with restrictions:                     Written patient dismissal instructions given to patient or POA.          Electronically signed by Izabela Matute MD on 7/8/2021 at 10:29 AM

## 2021-07-22 ENCOUNTER — HOSPITAL ENCOUNTER (OUTPATIENT)
Dept: WOUND CARE | Age: 46
Discharge: HOME | End: 2021-07-22
Admitting: ORTHOPAEDIC SURGERY
Payer: MEDICAID

## 2021-07-22 VITALS
HEART RATE: 90 BPM | DIASTOLIC BLOOD PRESSURE: 83 MMHG | RESPIRATION RATE: 18 BRPM | TEMPERATURE: 97.9 F | SYSTOLIC BLOOD PRESSURE: 139 MMHG

## 2021-07-22 PROCEDURE — 74011000250 HC RX REV CODE- 250: Performed by: ORTHOPAEDIC SURGERY

## 2021-07-22 PROCEDURE — 11042 DBRDMT SUBQ TIS 1ST 20SQCM/<: CPT

## 2021-07-22 RX ORDER — LIDOCAINE HYDROCHLORIDE 20 MG/ML
15 SOLUTION OROPHARYNGEAL AS NEEDED
Status: DISCONTINUED | OUTPATIENT
Start: 2021-07-22 | End: 2021-07-24 | Stop reason: HOSPADM

## 2021-07-22 NOTE — WOUND CARE
[address] [facility]:     [address] f: [phone] f: [phone] p: [phone] [email or web address]     Ordering Center:     [address]  [address] [facility]  [address] [id number] [phone]  WOUND CARE [email or web address]   FAX NUMBER [unfilled]    Patient Information:      Erika Burleson [id number]   [unfilled]   : 1975  AGE: 40 y.o. GENDER: female   TODAYS DATE:  2021    Insurance:      PRIMARY INSURANCE:  [id number] - (Medicaid)    Payor/Plan Subscr  Sex Relation Sub. Ins. ID Effective Group Num   1. BLUE CROSS MERoderic Goodell 1975 Female Self [id number] [id number]                                   [address] [address]   2. [address] [place] [address],  [address] R 1975 Female Self [id number]                                   [address] [address]       Patient Wound Information:      Problem List Items Addressed This Visit     None          WOUNDS REQUIRING DRESSING SUPPLIES:     Wound Leg lower Right;Medial #1 (Active)   Wound Image   21 0954   Wound Etiology Venous 21 0954   Dressing Status Clean;Dry; Intact 21 0954   Cleansed Cleansed with saline 21 0954   Offloading for Diabetic Foot Ulcers No offloading required 21 0932   Wound Length (cm) 2 cm 21 0954   Wound Width (cm) 1.5 cm 21 0954   Wound Depth (cm) 0.1 cm 21 0954   Wound Surface Area (cm^2) 3 cm^2 21 0954   Change in Wound Size % -150 21 0954   Wound Volume (cm^3) 0.3 cm^3 21 0954   Wound Healing % 38 21 0954   Post-Procedure Length (cm) 2.2 cm 21 0954   Post-Procedure Width (cm) 1.7 cm 21   Post-Procedure Depth (cm) 0.3 cm 21   Post-Procedure Surface Area (cm^2) 3.74 cm^2 21   Post-Procedure Volume (cm^3) 1.122 cm^3 21   Wound Assessment Slough;Pink/red;Granulation tissue 21 Drainage Amount Small 07/22/21 0954   Drainage Description Serosanguinous 07/22/21 0954   Wound Odor None 07/22/21 0954   Rosalva-Wound/Incision Assessment Hyperpigmented; Intact 07/22/21 0954   Edges Attached edges 07/22/21 0954   Wound Thickness Description Full thickness 07/22/21 0954   Number of days: 49        Supplies Requested :      WOUND #:1   PRIMARY DRESSING:  None   4X4 gauze pad, Conforming roll gauze and Coban     FREQUENCY OF DRESSING CHANGES:  Every other day           ADDITIONAL ITEMS:  [] Gloves Small  [x] Gloves Medium [x] Gloves Large [] Gloves XLarge  [] Tape 1\" [] Tape 2\" [] Tape 3\"  [x] Medipore Tape  [x] Saline  [] Skin Prep   [] Adhesive Remover   [] Cotton Tip Applicators   [] Other:    Patient Wound(s) Debrided: [] Yes if yes please add date 07/22/2021      Debribement Type: Excisional/Sharp    Patient currently being seen by Home Health: [] Yes   [x] No    Duration for needed supplies:  []15  [x]30  []60  []90 Days    Electronically signed by Toni Lehman LPN on 4/93/6187 at 92:28 AM    Provider Information:      PROVIDER'S NAME: Rickie Valencia MD

## 2021-07-22 NOTE — WOUND CARE
[address]   Progress Note and Procedure Note     [address] RECORD NUMBER:  [id number]  AGE: 40 y.o. RACE WHITE/NON-  GENDER: female  : 1975  EPISODE DATE:  2021      Subjective:     Chief Complaint   Patient presents with    Wound Check     R lower leg         HISTORY of PRESENT ILLNESS HPI    Erika Lang is a 40 y.o. female who presents today for wound/ulcer evaluation.    History of Wound Context:Started having wound on the right leg for the last 3 to 4 months, worse in the last 3 to 4 weeks, pain in the posterior calf is present aching type, she has had previous ulcerations, she has swelling of both the legs, the right is worse than the left, the left does not breakdown but the right is, she has had multiple venous ablations without any significant relief, she has gained weight over the years and has decreased exercise and has led to worsening of the swelling in both the legs, denies any diabetes, she is a smoker, she does not take any medications, patient doing well, still trying to quit smoking, down to half a pack per dayWound/Ulcer Pain Timing/Severity: None   Quality of pain: none  Severity:  0/ 10   Modifying Factors: None  Associated Signs/Symptoms: edema          PAST MEDICAL HISTORY    Past Medical History:   Diagnosis Date    Anxiety     Condyloma     H/O seasonal allergies     Leg ulcer, left ([address] 2019    treated at wound care center [facility]     Morbid obesity ([facility]     Psychiatric disorder     anxiety    Trichomonal vulvovaginitis     Uterine polyp     Venous insufficiency 2020    diagnosed by Dr. Jesse Miller    Past Surgical History:   Procedure Laterality Date    HX  SECTION      [address] [place] [address] AND CURETTAGE      D&E for anecephale    HX GI      HX GYN      hysteroscopy with polypectomy and ablation    HX HYSTEROSCOPY WITH ENDOMETRIAL ABLATION      HX ORTHOPAEDIC Bilateral 2005    ingrown toenail ablation       FAMILY HISTORY    Family History   Problem Relation Age of Onset    Nural tube defect Child        SOCIAL HISTORY    Social History     Tobacco Use    Smoking status: Current Every Day Smoker     Packs/day: 0.50     Years: 20.00     Pack years: 10.00    Smokeless tobacco: Never Used   Vaping Use    Vaping Use: Never used   Substance Use Topics    Alcohol use: No    Drug use: No       ALLERGIES    Allergies   Allergen Reactions    Wellbutrin [Bupropion Hcl] Hives       MEDICATIONS    Current Outpatient Medications on File Prior to Encounter   Medication Sig Dispense Refill    naproxen sodium (Aleve) 220 mg tablet Take 220 mg by mouth three (3) times daily (with meals). PRN       No current facility-administered medications on file prior to encounter. REVIEW OF SYSTEMS    A comprehensive review of systems was negative except for that written in the HPI. Objective:     Visit Vitals  /83 (BP 1 Location: Right upper arm, BP Patient Position: At rest;Sitting)   Pulse 90   Temp 97.9 °F (36.6 °C)   Resp 18       Wt Readings from Last 3 Encounters:   06/03/21 131.5 kg (290 lb)   03/06/20 138.5 kg (305 lb 4 oz)   02/28/20 138.5 kg (305 lb 4 oz)       PHYSICAL EXAM    Constitutional: Patient is awake, ambulating with no devices , no acute distress  Head: normocephalic, atraumatic. Behavioral/Psych: patient is pleasant, cooperative, awake and alert    Debridement Wound Care        Problem List Items Addressed This Visit     None          Procedure Note  Indications:  Based on my examination of this patient's wound(s)/ulcer(s) today, debridement is required to promote healing and evaluate the wound base.     Performed by: Alcira Gunderson MD    Consent obtained: yes  Time out taken: yes  Debridement: excisional    Using curette/scalpel the wound(s)/ulcer(s) was/were sharply debrided down through and including the removal of    subcutaneous tissue    Devitalized Tissue Debrided: slough    Pre Debridement Measurements:  Are located in the Wound/Ulcer Documentation Flow Sheet      Wound/Ulcer #: 1    Post Debridement Measurements:  Wound/Ulcer Descriptions are Pre Debridement except measurements:    Wound Leg lower Right;Medial #1 (Active)   Wound Image   07/22/21 0954   Wound Etiology Venous 07/22/21 0954   Dressing Status Clean;Dry; Intact 07/22/21 0954   Cleansed Cleansed with saline 07/22/21 0954   Offloading for Diabetic Foot Ulcers No offloading required 07/01/21 0932   Wound Length (cm) 2 cm 07/22/21 0954   Wound Width (cm) 1.5 cm 07/22/21 0954   Wound Depth (cm) 0.1 cm 07/22/21 0954   Wound Surface Area (cm^2) 3 cm^2 07/22/21 0954   Change in Wound Size % -150 07/22/21 0954   Wound Volume (cm^3) 0.3 cm^3 07/22/21 0954   Wound Healing % 38 07/22/21 0954   Post-Procedure Length (cm) 2.2 cm 07/22/21 0954   Post-Procedure Width (cm) 1.7 cm 07/22/21 0954   Post-Procedure Depth (cm) 0.3 cm 07/22/21 0954   Post-Procedure Surface Area (cm^2) 3.74 cm^2 07/22/21 0954   Post-Procedure Volume (cm^3) 1.122 cm^3 07/22/21 0954   Wound Assessment Slough;Pink/red;Granulation tissue 07/22/21 0954   Drainage Amount Small 07/22/21 0954   Drainage Description Serosanguinous 07/22/21 0954   Wound Odor None 07/22/21 0954   Rosalva-Wound/Incision Assessment Hyperpigmented; Intact 07/22/21 0954   Edges Attached edges 07/22/21 0954   Wound Thickness Description Full thickness 07/22/21 0954   Number of days: 49        Total Surface Area Debrided:  0.3 sq cm     Estimated Blood Loss:  Minimal     Hemostasis Achieved: with pressure    Procedural Pain: 0/ 10     Post Procedural Pain: 0/ 10     Response to treatment: Well-tolerated by the patient        Assessment:      Problem List Items Addressed This Visit     None          Peripheral venous insufficiency right leg  Ulcer posterior calf right leg  Stasis dermatitis bilateral lower extremities, morbid obesity, tobacco dependence    Plan:     Treatment Note please see attached Discharge Instructions  Return Appointment:  [x]? Dressing supply provider:Jeanne  []? Home Healthcare:  [x]? Return Appointment: 3 Week(s)  []? Nurse Visit: 1   Week(s)     []? [facility]  []? Ordered tests:    []? Referrals:   []? Rx:      Wound Cleansing:   Do not scrub or use excessive force. Cleanse wound prior to applying a clean dressing with:  [x]? Normal Saline          [x]? Mild Soap & Water    []? Keep Wound Dry in Shower  []? Other:       Topical Treatments:  Do not apply lotions, creams, or ointments to wound bed unless directed. [x]? Apply moisturizing lotion to skin surrounding the wound prior to dressing change. []? Apply antifungal ointment to skin surrounding the wound prior to dressing change. []? Apply thin film of moisture barrier ointment to skin immediately around wound. []? Other:                   Dressings:                  Wound Location right leg     []? Apply Primary Dressing:                                          []? MediHoney Gel         []? MediHoney Alginate                     []? Calcium Alginate      []? Calcium Alginate with Silver              []? Collagen                   []? Collagen with Silver                []? Santyl with Moistened saline gauze              []? Hydrofera Blue (cut to size and moistened)  []? Hydrofera Blue Ready (Cut to size)              []? NS wet to dry            []? Betadine wet to dry              []? Hydrogel                   []? Mepitel                   []? Bactroban/Mupirocin                       [x]? Other: silvercel     [x]? Cover and Secure with:                   [x]? Gauze        [x]? Christia Moran           []? Kerlix              []? Foam          []? Super Absorbant    []? ABD                                      []? ACE Wrap            []? Other:               Avoid contact of tape with skin.     [x]? Change dressing:  []? Daily           [x]? Every Other Day    []? Once per week   []? Twice per week              []? Three times per week        []? Do Not Change Dressing    []? Other:                Negative Pressure:           Wound Location:   []? Pressure @  mm/Hg                      []?Continuous  []? Intermittent              []? Black          []? White Foam              []? Bridge:               []? Change vac dressing three times per week     Pressure Relief:  []? When sitting, shift position or do seat lifts every 15 minutes. []? Wheelchair cushion           []? Specialty Bed/Mattress  []? Turn every 2 hours when in bed. Avoid direct pressure on wound site. Limit side lying to 30 degree tilt. Limit HOB elevation to 30 degrees. Edema Control:  Apply:  [x]? Compression Stocking      [x]? Right Leg     [x]? Left Leg              []? Tubigrip      []? Right Leg Double Layer      []? Left Leg Double Layer                                      []?Right Leg Single Layer       []? Left Leg Single Layer                 [x]? Elevate leg(s) above the level of the heart when sitting. [x]? Avoid prolonged standing in one place.         Compression:  Apply:  []? Multilayer Compression Wrap:      [x]? RightLeg      []? Left Leg                                 []?Profore            []? Profore Lite             []?Unna                 [x]? Do not get leg(s) with wrap wet. If wraps become too tight call the center or completely remove the wrap. [x]? Elevate leg(s) above the level of the heart when sitting. [x]? Avoid prolonged standing in one place.     Off-Loading:   []? Off-loading when   []? walking       []? in bed         []? sitting  []? Total non-weight bearing  []? Right Leg  []? Left Leg         []? Assistive Device    []? Gely Clamp        []? Cane      []? Wheelchair      []? Crutches              []? Surgical shoe    []? Podus Boot(s)   []? Foam Boot(s)  []? Roll About              []? Cast Boot   []? CROW Boot  []? Wedge Shoe  []? Other:     Contact Cast:  Apply:  []? Total Contact Cast Applied in Clinic          []? RightLeg      []? Left Leg              []? Do not get cast wet. Contact center or go to emergency room if there is a foul odor or becomes uncomfortable due to feeling tight or swelling. Do not use objects inside of cast to scratch.                  Dietary:  [x]? Diet as tolerated:   []? Calorie Diabetic Diet:         []? No Added Salt:  []? Increase Protein:   []? Other:              Activity:  [x]? Activity as tolerated:    []? Patient has no activity restrictions       []? Strict Bedrest:          []? Remain off Work:       []? May return to full duty work:                                               []? Return to work with restrictions:    Written patient dismissal instructions given to patient or POA.          Electronically signed by Brandi Payton MD on 7/22/2021 at 10:08 AM

## 2021-07-22 NOTE — DISCHARGE INSTRUCTIONS
Discharge Instructions for  [address] [place] [address]  Telephone: [phone]    NAME:  Teddy Whitaker OF BIRTH:  1975  MEDICAL RECORD NUMBER:  [id number]  DATE:  7/22/2021      Return Appointment:  [x] Dressing supply provider:Jeanne  [] Home Healthcare:  [x] Return Appointment: 3 Week(s)  [] Nurse Visit: 1   Week(s)    [] Discharge from [facility]  [] Ordered tests:    [] Referrals:   [] Rx:     Wound Cleansing:   Do not scrub or use excessive force. Cleanse wound prior to applying a clean dressing with:  [x] Normal Saline   [x] Mild Soap & Water    [] Keep Wound Dry in Shower  [] Other:      Topical Treatments:  Do not apply lotions, creams, or ointments to wound bed unless directed. [x] Apply moisturizing lotion to skin surrounding the wound prior to dressing change.  [] Apply antifungal ointment to skin surrounding the wound prior to dressing change.  [] Apply thin film of moisture barrier ointment to skin immediately around wound. [] Other:        Dressings:           Wound Location right leg   [] Apply Primary Dressing:       [] MediHoney Gel    [] MediHoney Alginate               [] Calcium Alginate      [] Calcium Alginate with Silver   [] Collagen                   [] Collagen with Silver                [] Santyl with Moistened saline gauze              [] Hydrofera Blue (cut to size and moistened)  [] Hydrofera Blue Ready (Cut to size)   [] NS wet to dry    [] Betadine wet to dry              [] Hydrogel                [] Mepitel     [] Bactroban/Mupirocin               [x] Other: silvercel    [x] Cover and Secure with:     [x] Gauze [x] Tiffanie [] Kerlix   [] Foam [] Super Absorbant [] ABD     [] ACE Wrap            [] Other:    Avoid contact of tape with skin.     [x] Change dressing: [] Daily    [x] Every Other Day [] Once per week   [] Twice per week   [] Three times per week [] Do Not Change Dressing   [] Other:     Negative Pressure:           Wound Location:   [] Pressure @  mm/Hg  []Continuous []Intermittent   [] Black  [] White Foam              [] Bridge:               [] Change vac dressing three times per week    Pressure Relief:  [] When sitting, shift position or do seat lifts every 15 minutes.  [] Wheelchair cushion [] Specialty Bed/Mattress  [] Turn every 2 hours when in bed. Avoid direct pressure on wound site. Limit side lying to 30 degree tilt. Limit HOB elevation to 30 degrees. Edema Control:  Apply: [x] Compression Stocking [x]Right Leg [x]Left Leg   [] Tubigrip []Right Leg Double Layer []Left Leg Double Layer      []Right Leg Single Layer []Left Leg Single Layer     [x] Elevate leg(s) above the level of the heart when sitting. [x] Avoid prolonged standing in one place. Compression:  Apply: [] Multilayer Compression Wrap: [x]RightLeg []Left Leg                                 []Profore  []Profore Lite             []Unna     [x] Do not get leg(s) with wrap wet. If wraps become too tight call the center or completely remove the wrap. [x] Elevate leg(s) above the level of the heart when sitting. [x] Avoid prolonged standing in one place. Off-Loading:   [] Off-loading when [] walking  [] in bed [] sitting  [] Total non-weight bearing  [] Right Leg  [] Left Leg   [] Assistive Device [] Walker [] Cane      [] Wheelchair  [] Crutches   [] Surgical shoe    [] Podus Boot(s)   [] Foam Boot(s)  [] Roll About    [] Cast Boot [] CROW Boot  [] Wedge Shoe  [] Other:    Contact Cast:  Apply: [] Total Contact Cast Applied in Clinic []RightLeg []Left Leg   [] Do not get cast wet. Contact center or go to emergency room if there is a foul odor or becomes uncomfortable due to feeling tight or swelling. Do not use objects inside of cast to scratch.       Dietary:  [x] Diet as tolerated: [] Calorie Diabetic Diet: [] No Added Salt:  [] Increase Protein: [] Other:     Activity:  [x] Activity as tolerated:    [] Patient has no activity restrictions       [] Strict Bedrest:   [] Remain off Work:       [] May return to full duty work:                                     [] Return to work with restrictions:                    [address] [facility] [address] [facility] [address] Information: Should you experience any significant changes in your wound(s) or have questions about your wound care, please contact Gala Cancino at [place] [address] 8:00 am - 4:30. If you need help with your wound outside of these hours and cannot wait until we are again available, contact your PCP or go to the hospital emergency room. PLEASE NOTE: IF YOU ARE UNABLE TO OBTAIN WOUND SUPPLIES, CONTINUE TO USE THE SUPPLIES YOU HAVE AVAILABLE UNTIL YOU ARE ABLE TO REACH US. IT IS MOST IMPORTANT TO KEEP THE WOUND COVERED AT ALL TIMES.     [] Dr. Art Boston   [] Dr. Samra Barroso  [x] Dr. Brandi Payton  [] Dr. Mirtha Nelson

## 2021-08-12 ENCOUNTER — HOSPITAL ENCOUNTER (OUTPATIENT)
Dept: WOUND CARE | Age: 46
Discharge: HOME | End: 2021-08-12
Admitting: ORTHOPAEDIC SURGERY
Payer: MEDICAID

## 2021-08-12 VITALS — HEART RATE: 85 BPM | SYSTOLIC BLOOD PRESSURE: 142 MMHG | DIASTOLIC BLOOD PRESSURE: 79 MMHG | TEMPERATURE: 97.6 F

## 2021-08-12 PROCEDURE — 11042 DBRDMT SUBQ TIS 1ST 20SQCM/<: CPT

## 2021-08-12 NOTE — WOUND CARE
Discharge Instructions for  [address] [place] [address], [address] [place] [address]  Telephone: [phone]    NAME:  Kat Alvarado OF BIRTH:  1975  MEDICAL RECORD NUMBER:  [id number]  DATE:  8/12/2021      Return Appointment:  [] Dressing supply provider:   [] Home Healthcare:  [x] Return Appointment: 4 Week(s)  [] Nurse Visit:    Week(s)    [] Discharge from [facility]  [] Ordered tests:    [] Referrals:   [x] Rx: compression pump ordered 7/8/21    Wound Cleansing:   Do not scrub or use excessive force. Cleanse wound prior to applying a clean dressing with:  [] Normal Saline   [x] Mild Soap & Water    [] Keep Wound Dry in Shower  [] Other:      Topical Treatments:  Do not apply lotions, creams, or ointments to wound bed unless directed. [x] Apply moisturizing lotion to skin surrounding the wound prior to dressing change.  [] Apply antifungal ointment to skin surrounding the wound prior to dressing change.  [] Apply thin film of moisture barrier ointment to skin immediately around wound. [] Other:        Dressings:           Wound Location right leg   [] Apply Primary Dressing:       [] MediHoney Gel    [] MediHoney Alginate               [] Calcium Alginate      [] Calcium Alginate with Silver   [] Collagen                   [] Collagen with Silver                [] Santyl with Moistened saline gauze              [] Hydrofera Blue (cut to size and moistened)  [] Hydrofera Blue Ready (Cut to size)   [] NS wet to dry    [] Betadine wet to dry              [] Hydrogel                [] Mepitel     [] Bactroban/Mupirocin               [x] Other: silvercel    [x] Cover and Secure with:     [x] Gauze [x] Tiffanie [] Kerlix   [] Foam [] Super Absorbant [] ABD     [] ACE Wrap            [] Other:    Avoid contact of tape with skin.     [x] Change dressing: [] Daily    [] Every Other Day [] Once per week   [] Twice per week   [x] Three times per week [] Do Not Change Dressing   [] Other:        Edema Control:  Apply: [x] Compression Stocking [x]Right Leg [x]Left Leg   [] Tubigrip []Right Leg Double Layer []Left Leg Double Layer      []Right Leg Single Layer []Left Leg Single Layer     [x] Elevate leg(s) above the level of the heart when sitting. [x] Avoid prolonged standing in one place. Compression:  Apply: [] Multilayer Compression Wrap: []RightLeg []Left Leg                                 []Profore  []Profore Lite             []Unna     [] Do not get leg(s) with wrap wet. If wraps become too tight call the center or completely remove the wrap. [] Elevate leg(s) above the level of the heart when sitting. [] Avoid prolonged standing in one place. Dietary:  [x] Diet as tolerated: [] Calorie Diabetic Diet: [x] No Added Salt:  [] Increase Protein: [] Other:     Activity:  [x] Activity as tolerated:    [] Patient has no activity restrictions       [] Strict Bedrest:   [] Remain off Work:       [] May return to full duty work:                                     [] Return to work with restrictions:                    [address] [facility] Information: Should you experience any significant changes in your wound(s) or have questions about your wound care, please contact Gala Negron 26 at [place] [phone] 8:00 am - 4:30. If you need help with your wound outside of these hours and cannot wait until we are again available, contact your PCP or go to the hospital emergency room. PLEASE NOTE: IF YOU ARE UNABLE TO OBTAIN WOUND SUPPLIES, CONTINUE TO USE THE SUPPLIES YOU HAVE AVAILABLE UNTIL YOU ARE ABLE TO REACH US. IT IS MOST IMPORTANT TO KEEP THE WOUND COVERED AT ALL TIMES.     [] Dr. Carmen Stallworth   [] Dr. Charli Singh  [x] Dr. Jorge Barahona  [] Dr. Ludin Castillo

## 2021-08-12 NOTE — WOUND CARE
[address]   Progress Note and Procedure Note     [address] RECORD NUMBER:  [id number]  AGE: 40 y.o. RACE WHITE/NON-  GENDER: female  : 1975  EPISODE DATE:  2021      Subjective:     Chief Complaint   Patient presents with    Wound Check     Right lower leg         HISTORY of PRESENT ILLNESS HPI    Shanon Blankenship is a 40 y.o. female who presents today for wound/ulcer evaluation.    History of Wound Context: Started having wound on the right leg for the last 3 to 4 months, worse in the last 3 to 4 weeks, pain in the posterior calf is present aching type, she has had previous ulcerations, she has swelling of both the legs, the right is worse than the left, the left does not breakdown but the right is, she has had multiple venous ablations without any significant relief, she has gained weight over the years and has decreased exercise and has led to worsening of the swelling in both the legs, denies any diabetes, she is a smoker, she does not take any medications, patient doing well, still trying to quit smoking, down to half a pack per dayWoundStarted having wound on the right leg for the last 3 to 4 months, worse in the last 3 to 4 weeks, pain in the posterior calf is present aching type, she has had previous ulcerations, she has swelling of both the legs, the right is worse than the left, the left does not breakdown but the right is, she has had multiple venous ablations without any significant relief, she has gained weight over the years and has decreased exercise and has led to worsening of the swelling in both the legs, denies any diabetes, she is a smoker, she does not take any medications, patient doing well, still trying to quit smoking, down to half a pack per dayWound  Wound/Ulcer Pain Timing/Severity: None   Quality of pain: none  Severity:  0/ 10   Modifying Factors: None  Associated Signs/Symptoms: edema          PAST MEDICAL HISTORY    Past Medical History:   Diagnosis Date    Anxiety     Condyloma     H/O seasonal allergies     Leg ulcer, left ([address] 2019    treated at wound care center [facility] Migraines     [facility] [address] [address]     Psychiatric disorder     anxiety    Trichomonal vulvovaginitis     Uterine polyp     Venous insufficiency 2020    diagnosed by Dr. Beth Mathias    Past Surgical History:   Procedure Laterality Date    HX  SECTION  2006    HX DILATION AND CURETTAGE      D&E for anecephale    HX GI      HX GYN      hysteroscopy with polypectomy and ablation    HX HYSTEROSCOPY WITH ENDOMETRIAL ABLATION      HX ORTHOPAEDIC Bilateral 2005    ingrown toenail ablation       FAMILY HISTORY    Family History   Problem Relation Age of Onset    Nural tube defect Child        SOCIAL HISTORY    Social History     Tobacco Use    Smoking status: Current Every Day Smoker     Packs/day: 0.50     Years: 20.00     Pack years: 10.00    Smokeless tobacco: Never Used   Vaping Use    Vaping Use: Never used   Substance Use Topics    Alcohol use: No    Drug use: No       ALLERGIES    Allergies   Allergen Reactions    Wellbutrin [Bupropion Hcl] Hives       MEDICATIONS    Current Outpatient Medications on File Prior to Encounter   Medication Sig Dispense Refill    naproxen sodium (Aleve) 220 mg tablet Take 220 mg by mouth three (3) times daily (with meals). PRN       No current facility-administered medications on file prior to encounter. REVIEW OF SYSTEMS    A comprehensive review of systems was negative except for that written in the HPI.     Objective:     Visit Vitals  BP (!) 142/79 (BP 1 Location: Right upper arm, BP Patient Position: At rest;Sitting)   Pulse 85   Temp 97.6 °F (36.4 °C)       Wt Readings from Last 3 Encounters:   21 131.5 kg (290 lb)   20 138.5 kg (305 lb 4 oz)   20 138.5 kg (305 lb 4 oz)       PHYSICAL EXAM    Constitutional: Patient is awake, ambulating with no devices , no acute distress  Head: normocephalic, atraumatic. Behavioral/Psych: patient is pleasant, cooperative, awake and alert       Debridement Wound Care        Problem List Items Addressed This Visit     None          Procedure Note  Indications:  Based on my examination of this patient's wound(s)/ulcer(s) today, debridement is required to promote healing and evaluate the wound base. Performed by: José Miguel Lockett MD    Consent obtained: yes  Time out taken: yes  Debridement: excisional    Using curette/scalpel the wound(s)/ulcer(s) was/were sharply debrided down through and including the removal of    subcutaneous tissue    Devitalized Tissue Debrided: slough    Pre Debridement Measurements:  Are located in the [facility]  Documentation Flow Sheet      Wound/Ulcer #: 1    Post Debridement Measurements:  Wound/Ulcer Descriptions are Pre Debridement except measurements:    Wound Leg lower Right;Medial #1 (Active)   Wound Image   08/12/21 1001   Wound Etiology Venous 08/12/21 1001   Dressing Status Clean;Dry; Intact 08/12/21 1001   Cleansed Cleansed with saline 08/12/21 1001   Offloading for Diabetic Foot Ulcers No offloading required 08/12/21 1001   Wound Length (cm) 1.9 cm 08/12/21 1001   Wound Width (cm) 1.8 cm 08/12/21 1001   Wound Depth (cm) 0.2 cm 08/12/21 1001   Wound Surface Area (cm^2) 3.42 cm^2 08/12/21 1001   Change in Wound Size % -185 08/12/21 1001   Wound Volume (cm^3) 0.684 cm^3 08/12/21 1001   Wound Healing % -42 08/12/21 1001   Post-Procedure Length (cm) 1.9 cm 08/12/21 1008   Post-Procedure Width (cm) 1.8 cm 08/12/21 1008   Post-Procedure Depth (cm) 0.3 cm 08/12/21 1008   Post-Procedure Surface Area (cm^2) 3.42 cm^2 08/12/21 1008   Post-Procedure Volume (cm^3) 1.026 cm^3 08/12/21 1008   Wound Assessment Granulation tissue; Subcutaneous 08/12/21 1001   Drainage Amount Small 08/12/21 1001   Drainage Description Serosanguinous 08/12/21 1001   Wound Odor None 08/12/21 1001   Rosalva-Wound/Incision Assessment Hyperpigmented 08/12/21 1001   Edges Attached edges 08/12/21 1001   Wound Thickness Description Full thickness 08/12/21 1001   Number of days: 70        Total Surface Area Debrided:  1 sq cm     Estimated Blood Loss:  None    Hemostasis Achieved: with pressure    Procedural Pain: 0/ 10     Post Procedural Pain: 0/ 10     Response to treatment: Well-tolerated by the patient        Assessment:      Problem List Items Addressed This Visit     None          Peripheral venous insufficiency right leg  Ulcer posterior calf right leg  Stasis dermatitis bilateral lower extremities, morbid obesity, tobacco dependence    Plan:     Treatment Note please see attached Discharge Instructions  Return Appointment:  []? Dressing supply provider:   []? Home Healthcare:  [x]? Return Appointment: 4 Week(s)  []? Nurse Visit:    Week(s)     []? [facility]charge from [facility]  []? Ordered tests:    []? Referrals:   [x]? Rx: compression pump ordered 7/8/21     Wound Cleansing:   Do not scrub or use excessive force. Cleanse wound prior to applying a clean dressing with:  []? Normal Saline          [x]? Mild Soap & Water    []? Keep Wound Dry in Shower  []? Other:       Topical Treatments:  Do not apply lotions, creams, or ointments to wound bed unless directed. [x]? Apply moisturizing lotion to skin surrounding the wound prior to dressing change. []? Apply antifungal ointment to skin surrounding the wound prior to dressing change. []? Apply thin film of moisture barrier ointment to skin immediately around wound. []? Other:                   Dressings:                  Wound Location right leg     []? Apply Primary Dressing:                                          []? MediHoney Gel         []? MediHoney Alginate                     []? Calcium Alginate      []?  Calcium Alginate with Silver              []? Collagen                   []? Collagen with Silver                []? Santyl with Moistened saline gauze              []? Hydrofera Blue (cut to size and moistened)  []? Hydrofera Blue Ready (Cut to size)              []? NS wet to dry            []? Betadine wet to dry              []? Hydrogel                   []? Mepitel                   []? Bactroban/Mupirocin                       [x]? Other: silvercel     [x]? Cover and Secure with:                   [x]? Gauze        [x]? Jeff Crook           []? Kerlix              []? Foam          []? Super Absorbant    []? ABD                                      []? ACE Wrap            []? Other:               Avoid contact of tape with skin.     [x]? Change dressing:  []? Daily           []? Every Other Day    []? Once per week   []? Twice per week              [x]? Three times per week        []? Do Not Change Dressing    []? Other:                              Edema Control:  Apply:  [x]? Compression Stocking      [x]? Right Leg     [x]? Left Leg              []? Tubigrip      []? Right Leg Double Layer      []? Left Leg Double Layer                                      []?Right Leg Single Layer       []? Left Leg Single Layer                 [x]? Elevate leg(s) above the level of the heart when sitting. [x]? Avoid prolonged standing in one place.         Compression:  Apply:  []? Multilayer Compression Wrap:      []? RightLeg      []? Left Leg                                 []?Profore            []? Profore Lite             []?Unna                 []? Do not get leg(s) with wrap wet. If wraps become too tight call the center or completely remove the wrap. []? Elevate leg(s) above the level of the heart when sitting. []? Avoid prolonged standing in one place.         Dietary:  [x]? Diet as tolerated:   []? Calorie Diabetic Diet:         [x]? No Added Salt:  []? Increase Protein:   []? Other:              Activity:  [x]? Activity as tolerated:    []? Patient has no activity restrictions       []?  Strict Bedrest:          []? Remain off Work:       []? May return to full duty work:                                               []? Return to work with restrictions:                Written patient dismissal instructions given to patient or POA.          Electronically signed by Elizabeth Chong MD on 8/12/2021 at 10:33 AM

## 2021-08-12 NOTE — DISCHARGE INSTRUCTIONS
Discharge Instructions for  [address] [place] [address], [address] [place] [address]  Telephone: [phone]    NAME:  Colten Julien OF BIRTH:  1975  MEDICAL RECORD NUMBER:  [id number]  DATE:  8/12/2021      Return Appointment:  [] Dressing supply provider:   [] Home Healthcare:  [x] Return Appointment: 4 Week(s)  [] Nurse Visit:    Week(s)    [] Discharge from [facility]  [] Ordered tests:    [] Referrals:   [x] Rx: compression pump ordered 7/8/21    Wound Cleansing:   Do not scrub or use excessive force. Cleanse wound prior to applying a clean dressing with:  [] Normal Saline   [x] Mild Soap & Water    [] Keep Wound Dry in Shower  [] Other:      Topical Treatments:  Do not apply lotions, creams, or ointments to wound bed unless directed. [x] Apply moisturizing lotion to skin surrounding the wound prior to dressing change.  [] Apply antifungal ointment to skin surrounding the wound prior to dressing change.  [] Apply thin film of moisture barrier ointment to skin immediately around wound. [] Other:        Dressings:           Wound Location right leg   [] Apply Primary Dressing:       [] MediHoney Gel    [] MediHoney Alginate               [] Calcium Alginate      [] Calcium Alginate with Silver   [] Collagen                   [] Collagen with Silver                [] Santyl with Moistened saline gauze              [] Hydrofera Blue (cut to size and moistened)  [] Hydrofera Blue Ready (Cut to size)   [] NS wet to dry    [] Betadine wet to dry              [] Hydrogel                [] Mepitel     [] Bactroban/Mupirocin               [x] Other: silvercel    [x] Cover and Secure with:     [x] Gauze [x] Tiffanie [] Kerlix   [] Foam [] Super Absorbant [] ABD     [] ACE Wrap            [] Other:    Avoid contact of tape with skin.     [x] Change dressing: [] Daily    [] Every Other Day [] Once per week   [] Twice per week   [x] Three times per week [] Do Not Change Dressing   [] Other:        Edema Control:  Apply: [x] Compression Stocking [x]Right Leg [x]Left Leg   [] Tubigrip []Right Leg Double Layer []Left Leg Double Layer      []Right Leg Single Layer []Left Leg Single Layer     [x] Elevate leg(s) above the level of the heart when sitting. [x] Avoid prolonged standing in one place. Compression:  Apply: [] Multilayer Compression Wrap: []RightLeg []Left Leg                                 []Profore  []Profore Lite             []Unna     [] Do not get leg(s) with wrap wet. If wraps become too tight call the center or completely remove the wrap. [] Elevate leg(s) above the level of the heart when sitting. [] Avoid prolonged standing in one place. Dietary:  [x] Diet as tolerated: [] Calorie Diabetic Diet: [x] No Added Salt:  [] Increase Protein: [] Other:     Activity:  [x] Activity as tolerated:    [] Patient has no activity restrictions       [] Strict Bedrest:   [] Remain off Work:       [] May return to full duty work:                                     [] Return to work with restrictions:                    [facility] Information: Should you experience any significant changes in your wound(s) or have questions about your wound care, please contact [address] at [place] [phone] 8:00 am - 4:30. If you need help with your wound outside of these hours and cannot wait until we are again available, contact your PCP or go to the hospital emergency room. PLEASE NOTE: IF YOU ARE UNABLE TO OBTAIN WOUND SUPPLIES, CONTINUE TO USE THE SUPPLIES YOU HAVE AVAILABLE UNTIL YOU ARE ABLE TO REACH US. IT IS MOST IMPORTANT TO KEEP THE WOUND COVERED AT ALL TIMES.     [] Dr. Ed Hansen   [] Dr. Husam Roy  [x] Dr. Dhara Mccarty  [] Dr. Fabian Fabry

## 2021-09-09 ENCOUNTER — HOSPITAL ENCOUNTER (OUTPATIENT)
Dept: WOUND CARE | Age: 46
Discharge: HOME | End: 2021-09-09
Payer: MEDICAID

## 2021-09-09 VITALS
TEMPERATURE: 98 F | HEART RATE: 84 BPM | RESPIRATION RATE: 18 BRPM | DIASTOLIC BLOOD PRESSURE: 75 MMHG | SYSTOLIC BLOOD PRESSURE: 148 MMHG

## 2021-09-09 PROCEDURE — 74011000250 HC RX REV CODE- 250: Performed by: ORTHOPAEDIC SURGERY

## 2021-09-09 PROCEDURE — 99212 OFFICE O/P EST SF 10 MIN: CPT

## 2021-09-09 RX ORDER — LIDOCAINE HYDROCHLORIDE 20 MG/ML
15 SOLUTION OROPHARYNGEAL AS NEEDED
Status: DISCONTINUED | OUTPATIENT
Start: 2021-09-09 | End: 2021-09-11 | Stop reason: HOSPADM

## 2021-09-09 NOTE — WOUND CARE
Discharge Instructions for  [address] [place] [address], [address] [place] [address]  Telephone: [phone]    NAME:  Waylon Sheridan OF BIRTH:  1975  MEDICAL RECORD NUMBER:  [id number]  DATE:  9/9/2021      Return Appointment:  [] Dressing supply provider:   [] Home Healthcare:  [x] Return Appointment: 4 Week(s)  [] Nurse Visit:    Week(s)    [] Discharge from [facility]  [] Ordered tests:    [] Referrals:   [x] Rx: compression pump ordered 7/8/21    Wound Cleansing:   Do not scrub or use excessive force. Cleanse wound prior to applying a clean dressing with:  [] Normal Saline   [x] Mild Soap & Water    [] Keep Wound Dry in Shower  [] Other:      Topical Treatments:  Do not apply lotions, creams, or ointments to wound bed unless directed. [x] Apply moisturizing lotion to skin surrounding the wound prior to dressing change.  [] Apply antifungal ointment to skin surrounding the wound prior to dressing change.  [] Apply thin film of moisture barrier ointment to skin immediately around wound. [] Other:        Dressings:           Wound Location right leg   [] Apply Primary Dressing:       [] MediHoney Gel    [] MediHoney Alginate               [] Calcium Alginate      [] Calcium Alginate with Silver   [] Collagen                   [] Collagen with Silver                [] Santyl with Moistened saline gauze              [] Hydrofera Blue (cut to size and moistened)  [] Hydrofera Blue Ready (Cut to size)   [] NS wet to dry    [] Betadine wet to dry              [] Hydrogel                [] Mepitel     [] Bactroban/Mupirocin               [x] Other: silvercel    [x] Cover and Secure with:     [x] Gauze [x] Tiffanie [] Kerlix   [] Foam [] Super Absorbant [] ABD     [] ACE Wrap            [] Other:    Avoid contact of tape with skin.     [x] Change dressing: [] Daily    [] Every Other Day [] Once per week   [] Twice per week   [x] Three times per week [] Do Not Change Dressing   [] Other:        Edema Control:  Apply: [x] Compression Stocking [x]Right Leg [x]Left Leg   [] Tubigrip []Right Leg Double Layer []Left Leg Double Layer      []Right Leg Single Layer []Left Leg Single Layer     [x] Elevate leg(s) above the level of the heart when sitting. [x] Avoid prolonged standing in one place. Compression:  Apply: [] Multilayer Compression Wrap: []RightLeg []Left Leg                                 []Profore  []Profore Lite             []Unna     [] Do not get leg(s) with wrap wet. If wraps become too tight call the center or completely remove the wrap. [] Elevate leg(s) above the level of the heart when sitting. [] Avoid prolonged standing in one place. Dietary:  [x] Diet as tolerated: [] Calorie Diabetic Diet: [x] No Added Salt:  [] Increase Protein: [] Other:     Activity:  [x] Activity as tolerated:    [] Patient has no activity restrictions       [] Strict Bedrest:   [] Remain off Work:       [] May return to full duty work:                                     [] Return to work with restrictions:                    [facility] Information: Should you experience any significant changes in your wound(s) or have questions about your wound care, please contact [address] at [place] [phone] 8:00 am - 4:30. If you need help with your wound outside of these hours and cannot wait until we are again available, contact your PCP or go to the hospital emergency room. PLEASE NOTE: IF YOU ARE UNABLE TO OBTAIN WOUND SUPPLIES, CONTINUE TO USE THE SUPPLIES YOU HAVE AVAILABLE UNTIL YOU ARE ABLE TO REACH US. IT IS MOST IMPORTANT TO KEEP THE WOUND COVERED AT ALL TIMES.     [] Dr. Prateek Dozier   [] Dr. Benson Mark  [x] Dr. Brittany Coles  [] Dr. Jacob Labs

## 2021-09-09 NOTE — WOUND CARE
[address]   Progress Note and Procedure Note     [address] RECORD NUMBER:  [id number]  AGE: 40 y.o. RACE WHITE/NON-  GENDER: female  : 1975  EPISODE DATE:  2021      Subjective:     Chief Complaint   Patient presents with    Wound Check     R lower leg         HISTORY of PRESENT ILLNESS HPI    Gio Galindo is a 40 y.o. female who presents today for wound/ulcer evaluation.    History of Wound Context: Patient comes with persistent ulceration in the right leg, seems to be getting better, she is using lymphedema pumps and dressings, this seems to be helping  Wound/Ulcer Pain Timing/Severity: None   Quality of pain: none  Severity:  0/ 10   Modifying Factors: None  Associated Signs/Symptoms: edema          PAST MEDICAL HISTORY    Past Medical History:   Diagnosis Date    Anxiety     Condyloma     H/O seasonal allergies     Leg ulcer, left ([address] 2019    treated at wound care center [facility] Migraines     [facility] [address]     Psychiatric disorder     anxiety    Trichomonal vulvovaginitis     Uterine polyp     Venous insufficiency 2020    diagnosed by Dr. Ariella Jhaveri    Past Surgical History:   Procedure Laterality Date    HX  SECTION      [address] [place] [address] AND CURETTAGE      D&E for anecephale    HX GI      HX GYN      hysteroscopy with polypectomy and ablation    HX HYSTEROSCOPY WITH ENDOMETRIAL ABLATION      HX ORTHOPAEDIC Bilateral 2005    ingrown toenail ablation       FAMILY HISTORY    Family History   Problem Relation Age of Onset    Nural tube defect Child        SOCIAL HISTORY    Social History     Tobacco Use    Smoking status: Current Every Day Smoker     Packs/day: 0.50     Years: 20.00     Pack years: 10.00    Smokeless tobacco: Never Used   Vaping Use    Vaping Use: Never used   Substance Use Topics    Alcohol use: No    Drug use: No       ALLERGIES    Allergies   Allergen Reactions    Wellbutrin [Bupropion Hcl] Hives       MEDICATIONS    Current Outpatient Medications on File Prior to Encounter   Medication Sig Dispense Refill    naproxen sodium (Aleve) 220 mg tablet Take 220 mg by mouth three (3) times daily (with meals). PRN       No current facility-administered medications on file prior to encounter. REVIEW OF SYSTEMS    A comprehensive review of systems was negative except for that written in the HPI. Objective:     Visit Vitals  BP (!) 148/75 (BP 1 Location: Right upper arm, BP Patient Position: At rest;Sitting)   Pulse 84   Temp 98 °F (36.7 °C)   Resp 18       Wt Readings from Last 3 Encounters:   06/03/21 131.5 kg (290 lb)   03/06/20 138.5 kg (305 lb 4 oz)   02/28/20 138.5 kg (305 lb 4 oz)       PHYSICAL EXAM    Constitutional: Patient is awake, ambulating with no devices , no acute distress  Head: normocephalic, atraumatic. Behavioral/Psych: patient is pleasant, cooperative, awake and alert        Wound Leg lower Right;Medial #1 (Active)   Wound Image   09/09/21 1008   Wound Etiology Venous 09/09/21 1008   Dressing Status Clean;Dry; Intact 09/09/21 1008   Cleansed Cleansed with saline 09/09/21 1008   Offloading for Diabetic Foot Ulcers No offloading required 08/12/21 1001   Wound Length (cm) 0.2 cm 09/09/21 1008   Wound Width (cm) 0.2 cm 09/09/21 1008   Wound Depth (cm) 0.1 cm 09/09/21 1008   Wound Surface Area (cm^2) 0.04 cm^2 09/09/21 1008   Change in Wound Size % 96.67 09/09/21 1008   Wound Volume (cm^3) 0.004 cm^3 09/09/21 1008   Wound Healing % 99 09/09/21 1008   Post-Procedure Length (cm) 1.9 cm 08/12/21 1008   Post-Procedure Width (cm) 1.8 cm 08/12/21 1008   Post-Procedure Depth (cm) 0.3 cm 08/12/21 1008   Post-Procedure Surface Area (cm^2) 3.42 cm^2 08/12/21 1008   Post-Procedure Volume (cm^3) 1.026 cm^3 08/12/21 1008   Wound Assessment Granulation tissue; Subcutaneous; [facility] 09/09/21 1008   Drainage Amount Small 09/09/21 1008   Drainage Description Serosanguinous 09/09/21 1008   Wound Odor None 09/09/21 1008   Rosalva-Wound/Incision Assessment Hyperpigmented 09/09/21 1008   Edges Attached edges 09/09/21 1008   Wound Thickness Description Full thickness 09/09/21 1008   Number of days: 98              Problem List Items Addressed This Visit     None          Peripheral venous insufficiency right leg  Ulcer posterior calf right leg  Stasis dermatitis bilateral lower extremities, morbid obesity, tobacco dependence   Lymphedema bilateral lower extremity    Plan:     Treatment Note please see attached Discharge Instructions  Patient is advised to see vascular surgeon but she does not want any intervention at this time, will continue with compression dressings lymphedema pumps, patient to follow-up in 4 weeks    Written patient dismissal instructions given to patient or POA.          Electronically signed by Elizabeth Chong MD on 9/9/2021 at 10:26 AM

## 2021-11-11 ENCOUNTER — HOSPITAL ENCOUNTER (OUTPATIENT)
Dept: WOUND CARE | Age: 46
Discharge: HOME | End: 2021-11-11
Payer: MEDICAID

## 2021-11-11 VITALS
RESPIRATION RATE: 18 BRPM | TEMPERATURE: 98.3 F | SYSTOLIC BLOOD PRESSURE: 156 MMHG | HEART RATE: 110 BPM | DIASTOLIC BLOOD PRESSURE: 73 MMHG

## 2021-11-11 PROCEDURE — 99213 OFFICE O/P EST LOW 20 MIN: CPT

## 2021-11-11 PROCEDURE — 74011000250 HC RX REV CODE- 250: Performed by: ORTHOPAEDIC SURGERY

## 2021-11-11 RX ORDER — LIDOCAINE HYDROCHLORIDE 20 MG/ML
15 SOLUTION OROPHARYNGEAL AS NEEDED
Status: DISCONTINUED | OUTPATIENT
Start: 2021-11-11 | End: 2021-11-13 | Stop reason: HOSPADM

## 2021-11-11 NOTE — DISCHARGE INSTRUCTIONS
Discharge Instructions for  [address] [place] [address], [facility]  Telephone: [phone]     NAME:  Gregory Rebolledo OF BIRTH:  1975  MEDICAL RECORD NUMBER:  [id number]  DATE:  11/11/2021        Return Appointment:  []? Dressing supply provider:   []? Home Healthcare:  [x]? Return Appointment: 3 Week(s)  []? Nurse Visit:    Week(s)     []? Discharge from [facility]  []? Ordered tests:    []? Referrals:   [x]? Rx: compression pump ordered COMPANY IS OUT OF NETWORK- INSURANCE COMPANY TO SEND LIST TO ORDER FROM.       Wound Cleansing:   Do not scrub or use excessive force. Cleanse wound prior to applying a clean dressing with:  []? Normal Saline          [x]? Mild Soap & Water    []? Keep Wound Dry in Shower  []? Other:       Topical Treatments:  Do not apply lotions, creams, or ointments to wound bed unless directed. [x]? Apply moisturizing lotion to skin surrounding the wound prior to dressing change. []? Apply antifungal ointment to skin surrounding the wound prior to dressing change. []? Apply thin film of moisture barrier ointment to skin immediately around wound. []? Other:                   Dressings:                  Wound Location right leg     []? Apply Primary Dressing:                                          []? MediHoney Gel         []? MediHoney Alginate                     []? Calcium Alginate      []? Calcium Alginate with Silver              []? Collagen                   []? Collagen with Silver                []? Santyl with Moistened saline gauze              []? Hydrofera Blue (cut to size and moistened)  []? Hydrofera Blue Ready (Cut to size)              []? NS wet to dry            []? Betadine wet to dry              []? Hydrogel                   []? Mepitel                   []? Bactroban/Mupirocin                       [x]? Other: silvercel     [x]? Cover and Secure with:                   [x]?  Gauze [x]? Biju Coaster           []? Kerlix              []? Foam          []? Super Absorbant    []? ABD                                      []? ACE Wrap            []? Other:               Avoid contact of tape with skin.     [x]? Change dressing:  []? Daily           []? Every Other Day    []? Once per week   []? Twice per week              [x]? Three times per week        []? Do Not Change Dressing    []? Other:                              Edema Control:  Apply:  [x]? Compression Stocking      [x]? Right Leg     [x]? Left Leg              []? Tubigrip      []? Right Leg Double Layer      []? Left Leg Double Layer                                      []?Right Leg Single Layer       []? Left Leg Single Layer                 [x]? Elevate leg(s) above the level of the heart when sitting. [x]? Avoid prolonged standing in one place.         Compression:  Apply:  []? Multilayer Compression Wrap:      []? RightLeg      []? Left Leg                                 []?Profore            []? Profore Lite             []?Unna                 []? Do not get leg(s) with wrap wet. If wraps become too tight call the center or completely remove the wrap. []? Elevate leg(s) above the level of the heart when sitting. []? Avoid prolonged standing in one place.         Dietary:  [x]? Diet as tolerated:   []? Calorie Diabetic Diet:         [x]? No Added Salt:  []? Increase Protein:   []? Other:              Activity:  [x]? Activity as tolerated:    []? Patient has no activity restrictions       []? Strict Bedrest:          []? Remain off Work:       []?  May return to full duty work:                                               []? Return to work with restrictions:      [address] Information: Should you experience any significant changes in your wound(s) or have questions about your wound care, please contact Gala Cancino at [address] 8:00 am - 4:30.  If you need help with your wound outside of these hours and cannot wait until we are again available, contact your PCP or go to the hospital emergency room.      PLEASE NOTE: IF YOU ARE UNABLE TO [address], CONTINUE TO USE THE SUPPLIES YOU HAVE AVAILABLE UNTIL YOU ARE ABLE TO [address]. IT IS MOST IMPORTANT TO KEEP THE WOUND COVERED AT ALL TIMES.     []? Dr. Romero Pandey   []? Dr. Jose Leonard  [x]? Dr. Daxa Stephen  []?  Dr. Dena Delcid

## 2021-11-11 NOTE — WOUND CARE
[address] f: [phone] f: [phone] p: [phone] [email or web address]     Ordering Center:     [facility] OP WOUND CARE  [address] [facility] [phone] [phone]  [address] [facility] [phone] 35[address] [facility] NUMBER [id number]    Patient Information:      Audria Brunner [place]   [phone]   : 1975  AGE: 40 y.o. GENDER: female   TODAYS DATE:  2021    Insurance:      PRIMARY INSURANCE:  [id number] - (Medicaid)    Payor/Plan Subscr  Sex Relation Sub. Ins. ID Effective Group Num   1. BLUE CROSS MEDeanne Read 1975 Female Self [id number]                                   [address] [address]   2.  [facility],  [address] R 1975 Female Self [id number]                                   [address] [address]       Patient Wound Information:      Problem List Items Addressed This Visit     None          WOUNDS REQUIRING DRESSING SUPPLIES:     Wound Leg lower Right;Medial #1 (Active)   Wound Image   21 0837   Wound Etiology Venous 21 0837   Dressing Status Clean; Dry; Intact 21 0837   Cleansed Cleansed with saline 21 0837   Offloading for Diabetic Foot Ulcers No offloading required 21 1001   Wound Length (cm) 5.5 cm 21 0837   Wound Width (cm) 3 cm 21 0837   Wound Depth (cm) 0.1 cm 21 0837   Wound Surface Area (cm^2) 16.5 cm^2 21 0837   Change in Wound Size % -1275 21 0837   Wound Volume (cm^3) 1.65 cm^3 21 0837   Wound Healing % -244 21 0837   Post-Procedure Length (cm) 1.9 cm 21 1008   Post-Procedure Width (cm) 1.8 cm 21 1008   Post-Procedure Depth (cm) 0.3 cm 21 1008   Post-Procedure Surface Area (cm^2) 3.42 cm^2 21 1008   Post-Procedure Volume (cm^3) 1.026 cm^3 21 1008   Wound Assessment Granulation tissue 21 3765   Drainage Amount Small 11/11/21 0837   Drainage Description Serosanguinous 11/11/21 0837   Wound Odor None 11/11/21 0837   Rosalva-Wound/Incision Assessment Hyperpigmented 11/11/21 0837   Edges Attached edges 11/11/21 0837   Wound Thickness Description Full thickness 11/11/21 0837   Number of days: 161        Supplies Requested :      WOUND #:1   PRIMARY DRESSING:  Other: silvercel   4X4 gauze pad, Conforming roll gauze and Coban     FREQUENCY OF DRESSING CHANGES:  Three times per week       WOUND #:    PRIMARY DRESSING:          FREQUENCY OF DRESSING CHANGES:         WOUND #:   PRIMARY DRESSING:          FREQUENCY OF DRESSING CHANGES:       ADDITIONAL ITEMS:  [] Gloves Small  [x] Gloves Medium [] Gloves Large [] Gloves XLarge  [] Tape 1\" [x] Tape 4\" [] Tape 3\"  [x] Medipore Tape  [x] Saline  [] Skin Prep   [] Adhesive Remover   [] Cotton Tip Applicators   [] Other:    Patient Wound(s) Debrided: [x] Yes if yes please add date 8/12/21   [] No    Debribement Type: Excisional/Sharp    Patient currently being seen by Home Health: [] Yes   [x] No    Duration for needed supplies:  []15  [x]30  []60  []90 Days    Electronically signed by Chiara Lora RN on 11/11/2021 at 9:43 AM    Provider Information:      PROVIDER'S NAME: Dr. Clover Almazan    NPI:

## 2021-11-11 NOTE — WOUND CARE
[address]   Progress Note and Procedure Note     [address] RECORD NUMBER:  [id number]  AGE: 40 y.o. RACE WHITE/NON-  GENDER: female  : 1975  EPISODE DATE:  2021      Subjective:     Chief Complaint   Patient presents with    Wound Check     RIGHT LEG         HISTORY of PRESENT ILLNESS HPI    Jarrod Barroso is a 40 y.o. female who presents today for wound/ulcer evaluation. History of Wound Context: Patient comes with persistent wounds, she had healed up and the wound of started come back again the swelling is less but she still open wound for the last 4 to 6 weeks, she has not got her pump yet  Wound/Ulcer Pain Timing/Severity: None   Quality of pain: none  Severity:  0/ 10   Modifying Factors: None  Associated Signs/Symptoms: edema          PAST MEDICAL HISTORY    Past Medical History:   Diagnosis Date    Anxiety     Condyloma     H/O seasonal allergies     Leg ulcer, left ([address] 2019    treated at wound care center [facility] Migraines     Morbid [facility] [address] [address]     Psychiatric disorder     anxiety    Trichomonal vulvovaginitis     Uterine polyp     Venous insufficiency 2020    diagnosed by Dr. Donnie Calderón    Past Surgical History:   Procedure Laterality Date    HX  SECTION      [address] [place] [address] AND CURETTAGE      D&E for anecephale    HX GI      HX GYN      hysteroscopy with polypectomy and ablation    HX HYSTEROSCOPY WITH ENDOMETRIAL ABLATION      HX ORTHOPAEDIC Bilateral 2005    ingrown toenail ablation       FAMILY HISTORY    Family History   Problem Relation Age of Onset    Nural tube defect Child        SOCIAL HISTORY    Social History     Tobacco Use    Smoking status: Current Every Day Smoker     Packs/day: 0.50     Years: 20.00     Pack years: 10.00    Smokeless tobacco: Never Used   Vaping Use    Vaping Use: Never used   Substance Use Topics    Alcohol use: No    Drug use:  No ALLERGIES    Allergies   Allergen Reactions    Wellbutrin [Bupropion Hcl] Hives       MEDICATIONS    Current Outpatient Medications on File Prior to Encounter   Medication Sig Dispense Refill    naproxen sodium (Aleve) 220 mg tablet Take 220 mg by mouth three (3) times daily (with meals). PRN       No current facility-administered medications on file prior to encounter. REVIEW OF SYSTEMS    A comprehensive review of systems was negative except for that written in the HPI. Objective:     Visit Vitals  BP (!) 156/73 (BP 1 Location: Left arm, BP Patient Position: At rest;Sitting)   Pulse (!) 110   Temp 98.3 °F (36.8 °C)   Resp 18       Wt Readings from Last 3 Encounters:   06/03/21 131.5 kg (290 lb)   03/06/20 138.5 kg (305 lb 4 oz)   02/28/20 138.5 kg (305 lb 4 oz)       PHYSICAL EXAM    Constitutional: Patient is awake, ambulating with no devices , no acute distress  Head: normocephalic, atraumatic. Behavioral/Psych: patient is pleasant, cooperative, awake and alert    Debridement Wound Care        Problem List Items Addressed This Visit     None          Procedure Note  Indications:  Based on my examination of this patient's wound(s)/ulcer(s) today, debridement is not required to promote healing and evaluate the wound base.     Performed by: Apoorva Lomeli MD    Consent obtained: yes  Time out taken: yes  Debridement: excisional    Using curette/scalpel the wound(s)/ulcer(s) was/were sharply debrided down through and including the removal of    Not needed    Devitalized Tissue Debrided: Not needed    Pre Debridement Measurements:  Are located in the [place]  Documentation Flow Sheet      Wound/Ulcer #: 1    Post Debridement Measurements:  Wound/Ulcer Descriptions are Pre Debridement except measurements:    Wound Leg lower Right;Medial #1 (Active)   Wound Image   11/11/21 0837   Wound Etiology Venous 11/11/21 0837   Dressing Status Clean; Dry; Intact 11/11/21 0837   Cleansed Cleansed with saline 11/11/21 0837   Offloading for Diabetic Foot Ulcers No offloading required 08/12/21 1001   Wound Length (cm) 5.5 cm 11/11/21 0837   Wound Width (cm) 3 cm 11/11/21 0837   Wound Depth (cm) 0.1 cm 11/11/21 0837   Wound Surface Area (cm^2) 16.5 cm^2 11/11/21 0837   Change in Wound Size % -1275 11/11/21 0837   Wound Volume (cm^3) 1.65 cm^3 11/11/21 0837   Wound Healing % -244 11/11/21 0837   Post-Procedure Length (cm) 1.9 cm 08/12/21 1008   Post-Procedure Width (cm) 1.8 cm 08/12/21 1008   Post-Procedure Depth (cm) 0.3 cm 08/12/21 1008   Post-Procedure Surface Area (cm^2) 3.42 cm^2 08/12/21 1008   Post-Procedure Volume (cm^3) 1.026 cm^3 08/12/21 1008   Wound Assessment Granulation tissue 11/11/21 0837   Drainage Amount Small 11/11/21 0837   Drainage Description Serosanguinous 11/11/21 0837   Wound Odor None 11/11/21 0837   Rosalva-Wound/Incision Assessment Hyperpigmented 11/11/21 0837   Edges Attached edges 11/11/21 0837   Wound Thickness Description Full thickness 11/11/21 0837   Number of days: 161            Assessment:      Morbid obesity  Venous insufficiency bilateral  Venous ulcer right side  Lymphedema bilateral    Plan:     Treatment Note please see attached Discharge Instructions patient is recommended compression pumps, vascular intervention evaluation for possible vein closure  Return Appointment:  []? Dressing supply provider:   []? Home Healthcare:  [x]? Return Appointment: 3 Week(s)  []? Nurse Visit:    Week(s)     []? Discharge from [facility]  []? Ordered tests:    []? Referrals:   [x]? Rx: compression pump ordered COMPANY IS OUT OF NETWORK- INSURANCE COMPANY TO SEND LIST TO ORDER FROM.       Wound Cleansing:   Do not scrub or use excessive force. Cleanse wound prior to applying a clean dressing with:  []? Normal Saline          [x]? Mild Soap & Water    []? Keep Wound Dry in Shower  []? Other:       Topical Treatments:  Do not apply lotions, creams, or ointments to wound bed unless directed. [x]?  Apply moisturizing lotion to skin surrounding the wound prior to dressing change. []? Apply antifungal ointment to skin surrounding the wound prior to dressing change. []? Apply thin film of moisture barrier ointment to skin immediately around wound. []? Other:                   Dressings:                  Wound Location right leg     []? Apply Primary Dressing:                                          []? MediHoney Gel         []? MediHoney Alginate                     []? Calcium Alginate      []? Calcium Alginate with Silver              []? Collagen                   []? Collagen with Silver                []? Santyl with Moistened saline gauze              []? Hydrofera Blue (cut to size and moistened)  []? Hydrofera Blue Ready (Cut to size)              []? NS wet to dry            []? Betadine wet to dry              []? Hydrogel                   []? Mepitel                   []? Bactroban/Mupirocin                       [x]? Other: silvercel     [x]? Cover and Secure with:                   [x]? Gauze        [x]? Zula Pages           []? Kerlix              []? Foam          []? Super Absorbant    []? ABD                                      []? ACE Wrap            []? Other:               Avoid contact of tape with skin.     [x]? Change dressing:  []? Daily           []? Every Other Day    []? Once per week   []? Twice per week              [x]? Three times per week        []? Do Not Change Dressing    []? Other:                              Edema Control:  Apply:  [x]? Compression Stocking      [x]? Right Leg     [x]? Left Leg              []? Tubigrip      []? Right Leg Double Layer      []? Left Leg Double Layer                                      []?Right Leg Single Layer       []? Left Leg Single Layer                 [x]? Elevate leg(s) above the level of the heart when sitting. [x]? Avoid prolonged standing in one place.         Compression:  Apply:  []?  Multilayer Compression Wrap: []?RightLeg      []? Left Leg                                 []?Profore            []? Profore Lite             []?Unna                 []? Do not get leg(s) with wrap wet. If wraps become too tight call the center or completely remove the wrap. []? Elevate leg(s) above the level of the heart when sitting. []? Avoid prolonged standing in one place.         Dietary:  [x]? Diet as tolerated:   []? Calorie Diabetic Diet:         [x]? No Added Salt:  []? Increase Protein:   []? Other:              Activity:  [x]? Activity as tolerated:    []? Patient has no activity restrictions       []? Strict Bedrest:          []? Remain off Work:       []? May return to full duty work:                                               []? Return to work with restrictions:                     Written patient dismissal instructions given to patient or POA.          Electronically signed by Red Sprague MD on 11/11/2021 at 9:08 AM

## 2021-11-11 NOTE — WOUND CARE
Discharge Instructions for  [address] [place] [address], [address] [place] [address]  Telephone: [phone]    NAME:  Ulises Celaya OF BIRTH:  1975  MEDICAL RECORD NUMBER:  [id number]  DATE:  11/11/2021      Return Appointment:  [] Dressing supply provider:   [] Home Healthcare:  [x] Return Appointment: 3 Week(s)  [] Nurse Visit:    Week(s)    [] [facility]charge from [facility]  [] Ordered tests:    [] Referrals:   [x] Rx: compression pump ordered COMPANY IS OUT OF NETWORK- INSURANCE COMPANY TO SEND LIST TO ORDER FROM. Wound Cleansing:   Do not scrub or use excessive force. Cleanse wound prior to applying a clean dressing with:  [] Normal Saline   [x] Mild Soap & Water    [] Keep Wound Dry in Shower  [] Other:      Topical Treatments:  Do not apply lotions, creams, or ointments to wound bed unless directed. [x] Apply moisturizing lotion to skin surrounding the wound prior to dressing change.  [] Apply antifungal ointment to skin surrounding the wound prior to dressing change.  [] Apply thin film of moisture barrier ointment to skin immediately around wound. [] Other:        Dressings:           Wound Location right leg   [] Apply Primary Dressing:       [] MediHoney Gel    [] MediHoney Alginate               [] Calcium Alginate      [] Calcium Alginate with Silver   [] Collagen                   [] Collagen with Silver                [] Santyl with Moistened saline gauze              [] Hydrofera Blue (cut to size and moistened)  [] Hydrofera Blue Ready (Cut to size)   [] NS wet to dry    [] Betadine wet to dry              [] Hydrogel                [] Mepitel     [] Bactroban/Mupirocin               [x] Other: silvercel    [x] Cover and Secure with:     [x] Gauze [x] Tiffanie [] Kerlix   [] Foam [] Super Absorbant [] ABD     [] ACE Wrap            [] Other:    Avoid contact of tape with skin.     [x] Change dressing: [] Daily    [] Every Other Day [] Once per week   [] Twice per week   [x] Three times per week [] Do Not Change Dressing   [] Other:        Edema Control:  Apply: [x] Compression Stocking [x]Right Leg [x]Left Leg   [] Tubigrip []Right Leg Double Layer []Left Leg Double Layer      []Right Leg Single Layer []Left Leg Single Layer     [x] Elevate leg(s) above the level of the heart when sitting. [x] Avoid prolonged standing in one place. Compression:  Apply: [] Multilayer Compression Wrap: []RightLeg []Left Leg                                 []Profore  []Profore Lite             []Unna     [] Do not get leg(s) with wrap wet. If wraps become too tight call the center or completely remove the wrap. [] Elevate leg(s) above the level of the heart when sitting. [] Avoid prolonged standing in one place. Dietary:  [x] Diet as tolerated: [] Calorie Diabetic Diet: [x] No Added Salt:  [] Increase Protein: [] Other:     Activity:  [x] Activity as tolerated:    [] Patient has no activity restrictions       [] Strict Bedrest:   [] Remain off Work:       [] May return to full duty work:                                     [] Return to work with restrictions:                    [address] Information: Should you experience any significant changes in your wound(s) or have questions about your wound care, please contact Gala Negron [address] at [place] [phone] 8:00 am - 4:30. If you need help with your wound outside of these hours and cannot wait until we are again available, contact your PCP or go to the hospital emergency room. PLEASE NOTE: IF YOU ARE UNABLE TO OBTAIN WOUND SUPPLIES, CONTINUE TO USE THE SUPPLIES YOU HAVE AVAILABLE UNTIL YOU ARE ABLE TO REACH US. IT IS MOST IMPORTANT TO KEEP THE WOUND COVERED AT ALL TIMES.     [] Dr. Cher Davison   [] Dr. Radha Nolan  [x] Dr. Cole Nobles  [] Dr. Moses Rosenbaum

## 2021-12-02 ENCOUNTER — HOSPITAL ENCOUNTER (OUTPATIENT)
Dept: WOUND CARE | Age: 46
Discharge: HOME | End: 2021-12-02
Payer: MEDICAID

## 2021-12-02 VITALS
TEMPERATURE: 98.4 F | HEART RATE: 91 BPM | DIASTOLIC BLOOD PRESSURE: 71 MMHG | RESPIRATION RATE: 18 BRPM | SYSTOLIC BLOOD PRESSURE: 132 MMHG

## 2021-12-02 PROCEDURE — 11042 DBRDMT SUBQ TIS 1ST 20SQCM/<: CPT

## 2021-12-02 PROCEDURE — 74011000250 HC RX REV CODE- 250: Performed by: ORTHOPAEDIC SURGERY

## 2021-12-02 RX ORDER — LIDOCAINE HYDROCHLORIDE 20 MG/ML
15 SOLUTION OROPHARYNGEAL AS NEEDED
Status: DISCONTINUED | OUTPATIENT
Start: 2021-12-02 | End: 2021-12-04 | Stop reason: HOSPADM

## 2021-12-02 NOTE — DISCHARGE INSTRUCTIONS
Discharge Instructions for  [address] [place] [address], [address] [place] [address]  Telephone: [phone]     NAME:  Rula Ruiz OF BIRTH:  1975  MEDICAL RECORD NUMBER:  [id number]  DATE:  12/2/2021        Return Appointment:  []? Dressing supply provider:   []? Home Healthcare:  [x]? Return Appointment: 2 Week(s)  []? Nurse Visit:    Week(s)     []? Discharge from [facility]  []? Ordered tests:    [x]? Referrals: Dr. Melodie Baron- keep appointment  [x]? Rx: compression pump ordered COMPANY IS OUT OF NETWORK- INSURANCE COMPANY TO SEND LIST TO ORDER FROM.       Wound Cleansing:   Do not scrub or use excessive force. Cleanse wound prior to applying a clean dressing with:  [x]? Normal Saline          [x]? Mild Soap & Water    []? Keep Wound Dry in Shower  []? Other:       Topical Treatments:  Do not apply lotions, creams, or ointments to wound bed unless directed. [x]? Apply moisturizing lotion to skin surrounding the wound prior to dressing change. []? Apply antifungal ointment to skin surrounding the wound prior to dressing change. []? Apply thin film of moisture barrier ointment to skin immediately around wound. []? Other:                   Dressings:                  Wound Location right leg     []? Apply Primary Dressing:                                          []? MediHoney Gel         []? MediHoney Alginate                     []? Calcium Alginate      []? Calcium Alginate with Silver              []? Collagen                   []? Collagen with Silver                []? Santyl with Moistened saline gauze              []? Hydrofera Blue (cut to size and moistened)  []? Hydrofera Blue Ready (Cut to size)              []? NS wet to dry            []? Betadine wet to dry              []? Hydrogel                   []? Mepitel                   []? Bactroban/Mupirocin                       [x]? Other: silvercel     [x]?  Cover and Secure with: [x]? Gauze        [x]? Rannie Brenden           []? Kerlix              []? Foam          []? Super Absorbant    []? ABD                                      []? ACE Wrap            []? Other:               Avoid contact of tape with skin.     [x]? Change dressing:  []? Daily           []? Every Other Day    []? Once per week   []? Twice per week              [x]? Three times per week        []? Do Not Change Dressing    []? Other:                              Edema Control:  Apply:  [x]? Compression Stocking      [x]? Right Leg     [x]? Left Leg              []? Tubigrip      []? Right Leg Double Layer      []? Left Leg Double Layer                                      []?Right Leg Single Layer       []? Left Leg Single Layer                 [x]? Elevate leg(s) above the level of the heart when sitting. [x]? Avoid prolonged standing in one place.         Compression:  Apply:  []? Multilayer Compression Wrap:      []? RightLeg      []? Left Leg                                 []?Profore            []? Profore Lite             []?Unna                 []? Do not get leg(s) with wrap wet. If wraps become too tight call the center or completely remove the wrap. []? Elevate leg(s) above the level of the heart when sitting. []? Avoid prolonged standing in one place.         Dietary:  [x]? Diet as tolerated:   []? Calorie Diabetic Diet:         [x]? No Added Salt:  []? Increase Protein:   []? Other:              Activity:  [x]? Activity as tolerated:    []? Patient has no activity restrictions       []? Strict Bedrest:          []? Remain off Work:       []?  May return to full duty work:                                               []? Return to work with restrictions:      [address] [employer] [address] Information: Should you experience any significant changes in your wound(s) or have questions about your wound care, please contact Gala Cancino at [phone] 8:00 am - 4:30. If you need help with your wound outside of these hours and cannot wait until we are again available, contact your PCP or go to the hospital emergency room.      PLEASE NOTE: IF YOU ARE UNABLE TO [address], CONTINUE TO USE THE SUPPLIES YOU HAVE AVAILABLE UNTIL YOU ARE ABLE TO [address]. IT IS MOST IMPORTANT TO KEEP THE WOUND COVERED AT ALL TIMES.     []? Dr. Aileen Prasad   []? Dr. Edward Sever  [x]? Dr. Khris Abdalla  []?  Dr. Chantelle Zamarripa

## 2021-12-02 NOTE — WOUND CARE
Discharge Instructions for  [address] [place] [address], [facility]  Telephone: [phone]    NAME:  Brigid Peña OF BIRTH:  1975  MEDICAL RECORD NUMBER:  [id number]  DATE:  12/2/2021      Return Appointment:  [] Dressing supply provider:   [] Home Healthcare:  [x] Return Appointment: 2 Week(s)  [] Nurse Visit:    Week(s)    [] Discharge from [facility]  [] Ordered tests:    [x] Referrals: Dr. Maicol Ayers- keep appointment  [x] Rx: compression pump ordered COMPANY IS  [address] LIST TO ORDER FROM. Wound Cleansing:   Do not scrub or use excessive force. Cleanse wound prior to applying a clean dressing with:  [x] Normal Saline   [x] Mild Soap & Water    [] Keep Wound Dry in Shower  [] Other:      Topical Treatments:  Do not apply lotions, creams, or ointments to wound bed unless directed. [x] Apply moisturizing lotion to skin surrounding the wound prior to dressing change.  [] Apply antifungal ointment to skin surrounding the wound prior to dressing change.  [] Apply thin film of moisture barrier ointment to skin immediately around wound. [] Other:        Dressings:           Wound Location right leg   [] Apply Primary Dressing:       [] MediHoney Gel    [] MediHoney Alginate               [] Calcium Alginate      [] Calcium Alginate with Silver   [] Collagen                   [] Collagen with Silver                [] Santyl with Moistened saline gauze              [] Hydrofera Blue (cut to size and moistened)  [] Hydrofera Blue Ready (Cut to size)   [] NS wet to dry    [] Betadine wet to dry              [] Hydrogel                [] Mepitel     [] Bactroban/Mupirocin               [x] Other: silvercel    [x] Cover and Secure with:     [x] Gauze [x] Tiffanie [] Kerlix   [] Foam [] Super Absorbant [] ABD     [] ACE Wrap            [] Other:    Avoid contact of tape with skin.     [x] Change dressing: [] Daily    [] Every Other Day [] Once per week   [] Twice per week   [x] Three times per week [] Do Not Change Dressing   [] Other:        Edema Control:  Apply: [x] Compression Stocking [x]Right Leg [x]Left Leg   [] Tubigrip []Right Leg Double Layer []Left Leg Double Layer      []Right Leg Single Layer []Left Leg Single Layer     [x] Elevate leg(s) above the level of the heart when sitting. [x] Avoid prolonged standing in one place. Compression:  Apply: [] Multilayer Compression Wrap: []RightLeg []Left Leg                                 []Profore  []Profore Lite             []Unna     [] Do not get leg(s) with wrap wet. If wraps become too tight call the center or completely remove the wrap. [] Elevate leg(s) above the level of the heart when sitting. [] Avoid prolonged standing in one place. Dietary:  [x] Diet as tolerated: [] Calorie Diabetic Diet: [x] No Added Salt:  [] Increase Protein: [] Other:     Activity:  [x] Activity as tolerated:    [] Patient has no activity restrictions       [] Strict Bedrest:   [] Remain off Work:       [] May return to full duty work:                                     [] Return to work with restrictions:                    [address] Information: Should you experience any significant changes in your wound(s) or have questions about your wound care, please contact Gala Cancino at [place] [phone] 8:00 am - 4:30. If you need help with your wound outside of these hours and cannot wait until we are again available, contact your PCP or go to the hospital emergency room. PLEASE NOTE: IF YOU ARE UNABLE TO OBTAIN WOUND SUPPLIES, CONTINUE TO USE THE SUPPLIES YOU HAVE AVAILABLE UNTIL YOU ARE ABLE TO REACH US. IT IS MOST IMPORTANT TO KEEP THE WOUND COVERED AT ALL TIMES.     [] Dr. Lauren Tomas   [] Dr. Karen Chow  [x] Dr. Apoorva Lomeli  [] Dr. Gardner Gosselin

## 2021-12-02 NOTE — WOUND CARE
[address]   Progress Note and Procedure Note     [address] RECORD NUMBER:  [id number]  AGE: 40 y.o. RACE WHITE/NON-  GENDER: female  : 1975  EPISODE DATE:  2021      Subjective:     Chief Complaint   Patient presents with    Wound Check     right leg         HISTORY of PRESENT ILLNESS HPI    Ej Zuluaga is a 40 y.o. female who presents today for wound/ulcer evaluation. History of Wound Context: Patient comes with persistent wounds, she had healed up and the wound of started come back again the swelling is less but she still open wound for the last 4 to 6 weeks, she has not got her pump yet  Wound/Ulcer Pain Timing/Severity: None   Quality of pain: none  Severity:  0/ 10   Modifying Factors: None  Associated Signs/Symptoms: edema          PAST MEDICAL HISTORY    Past Medical History:   Diagnosis Date    Anxiety     Condyloma     H/O seasonal allergies     Leg ulcer, left ([address] 2019    treated at wound care center [facility] Migraines     Morbid obesity ([facility]     Psychiatric disorder     anxiety    Trichomonal vulvovaginitis     Uterine polyp     Venous insufficiency 2020    diagnosed by Dr. Narendra Velarde    Past Surgical History:   Procedure Laterality Date    HX  SECTION      [address] [place] [address] AND CURETTAGE      D&E for anecephale    HX GI      HX GYN      hysteroscopy with polypectomy and ablation    HX HYSTEROSCOPY WITH ENDOMETRIAL ABLATION      HX ORTHOPAEDIC Bilateral 2005    ingrown toenail ablation       FAMILY HISTORY    Family History   Problem Relation Age of Onset    Nural tube defect Child        SOCIAL HISTORY    Social History     Tobacco Use    Smoking status: Current Every Day Smoker     Packs/day: 0.50     Years: 20.00     Pack years: 10.00    Smokeless tobacco: Never Used   Vaping Use    Vaping Use: Never used   Substance Use Topics    Alcohol use: No    Drug use:  No ALLERGIES    Allergies   Allergen Reactions    Wellbutrin [Bupropion Hcl] Hives       MEDICATIONS    Current Outpatient Medications on File Prior to Encounter   Medication Sig Dispense Refill    naproxen sodium (Aleve) 220 mg tablet Take 220 mg by mouth three (3) times daily (with meals). PRN       No current facility-administered medications on file prior to encounter. REVIEW OF SYSTEMS    A comprehensive review of systems was negative except for that written in the HPI. Objective:     Visit Vitals  /71 (BP 1 Location: Left arm, BP Patient Position: At rest;Sitting)   Pulse 91   Temp 98.4 °F (36.9 °C)   Resp 18       Wt Readings from Last 3 Encounters:   06/03/21 131.5 kg (290 lb)   03/06/20 138.5 kg (305 lb 4 oz)   02/28/20 138.5 kg (305 lb 4 oz)       PHYSICAL EXAM    Constitutional: Patient is awake, ambulating with no devices , no acute distress  Head: normocephalic, atraumatic. Behavioral/Psych: patient is pleasant, cooperative, awake and alert    Debridement Wound Care        Problem List Items Addressed This Visit     None          Procedure Note  Indications:  Based on my examination of this patient's wound(s)/ulcer(s) today, debridement is required to promote healing and evaluate the wound base.     Performed by: Javy Monreal MD    Consent obtained: yes  Time out taken: yes  Debridement: excisional    Using curette/scalpel the wound(s)/ulcer(s) was/were sharply debrided down through and including the removal of    subcutaneous tissue    Devitalized Tissue Debrided: slough    Pre Debridement Measurements:  Are located in the [facility]  Documentation Flow Sheet      Wound/Ulcer #: 1    Post Debridement Measurements:  Wound/Ulcer Descriptions are Pre Debridement except measurements:    Wound Leg lower Right;Medial #1 (Active)   Wound Image   12/02/21 0838   Wound Etiology Venous 12/02/21 0838   Dressing Status Other (Comment) 12/02/21 0838   Cleansed Cleansed with saline 12/02/21 0838   Dressing/Treatment Dry dressing 12/02/21 0852   Offloading for Diabetic Foot Ulcers No offloading required 12/02/21 0838   Wound Length (cm) 3.5 cm 12/02/21 0838   Wound Width (cm) 1.3 cm 12/02/21 0838   Wound Depth (cm) 0.1 cm 12/02/21 0838   Wound Surface Area (cm^2) 4.55 cm^2 12/02/21 0838   Change in Wound Size % -279.17 12/02/21 0838   Wound Volume (cm^3) 0.455 cm^3 12/02/21 0838   Wound Healing % 5 12/02/21 0838   Post-Procedure Length (cm) 3.7 cm 12/02/21 0848   Post-Procedure Width (cm) 1.5 cm 12/02/21 0848   Post-Procedure Depth (cm) 0.3 cm 12/02/21 0848   Post-Procedure Surface Area (cm^2) 5.55 cm^2 12/02/21 0848   Post-Procedure Volume (cm^3) 1.665 cm^3 12/02/21 0848   Wound Assessment Slough 12/02/21 0838   Drainage Amount None 12/02/21 0838   Drainage Description Serosanguinous 11/11/21 0837   Wound Odor None 12/02/21 0838   Rosalva-Wound/Incision Assessment Hyperpigmented 12/02/21 0838   Edges Attached edges 12/02/21 0838   Wound Thickness Description Full thickness 12/02/21 0838   Number of days: 182        Total Surface Area Debrided:  0.2 sq cm     Estimated Blood Loss:  Minimal     Hemostasis Achieved: with pressure    Procedural Pain: 0/ 10     Post Procedural Pain: 0/ 10     Response to treatment: Well-tolerated by the patient      Assessment:      Morbid obesity  Venous insufficiency bilateral  Venous ulcer right side  Lymphedema bilateral    Plan:     Treatment Note please see attached Discharge Instructions, patient is significantly improved, will continue to monitor, patient will see a vascular interventionalist for possible venous ablation  Return Appointment:  []? Dressing supply provider:   []? Home Healthcare:  [x]? Return Appointment: 2 Week(s)  []? Nurse Visit:    Week(s)     []? Discharge from [facility]  []? Ordered tests:    [x]? Referrals: Dr. Edward Saavedra- keep appointment  [x]?  Rx: compression pump ordered COMPANY IS OUT OF NETWORK- INSURANCE COMPANY TO SEND LIST TO ORDER FROM.        Wound Cleansing:   Do not scrub or use excessive force. Cleanse wound prior to applying a clean dressing with:  [x]? Normal Saline          [x]? Mild Soap & Water    []? Keep Wound Dry in Shower  []? Other:       Topical Treatments:  Do not apply lotions, creams, or ointments to wound bed unless directed. [x]? Apply moisturizing lotion to skin surrounding the wound prior to dressing change. []? Apply antifungal ointment to skin surrounding the wound prior to dressing change. []? Apply thin film of moisture barrier ointment to skin immediately around wound. []? Other:                   Dressings:                  Wound Location right leg     []? Apply Primary Dressing:                                          []? MediHoney Gel         []? MediHoney Alginate                     []? Calcium Alginate      []? Calcium Alginate with Silver              []? Collagen                   []? Collagen with Silver                []? Santyl with Moistened saline gauze              []? Hydrofera Blue (cut to size and moistened)  []? Hydrofera Blue Ready (Cut to size)              []? NS wet to dry            []? Betadine wet to dry              []? Hydrogel                   []? Mepitel                   []? Bactroban/Mupirocin                       [x]? Other: silvercel     [x]? Cover and Secure with:                   [x]? Gauze        [x]? Solange Leer           []? Kerlix              []? Foam          []? Super Absorbant    []? ABD                                      []? ACE Wrap            []? Other:               Avoid contact of tape with skin.     [x]? Change dressing:  []? Daily           []? Every Other Day    []? Once per week   []? Twice per week              [x]? Three times per week        []? Do Not Change Dressing    []? Other:                              Edema Control:  Apply:  [x]? Compression Stocking      [x]? Right Leg     [x]? Left Leg              []? Tubigrip      []? Right Leg Double Layer []?Left Leg Double Layer                                      []?Right Leg Single Layer       []? Left Leg Single Layer                 [x]? Elevate leg(s) above the level of the heart when sitting. [x]? Avoid prolonged standing in one place.         Compression:  Apply:  []? Multilayer Compression Wrap:      []? RightLeg      []? Left Leg                                 []?Profore            []? Profore Lite             []?Unna                 []? Do not get leg(s) with wrap wet. If wraps become too tight call the center or completely remove the wrap. []? Elevate leg(s) above the level of the heart when sitting. []? Avoid prolonged standing in one place.         Dietary:  [x]? Diet as tolerated:   []? Calorie Diabetic Diet:         [x]? No Added Salt:  []? Increase Protein:   []? Other:              Activity:  [x]? Activity as tolerated:    []? Patient has no activity restrictions       []? Strict Bedrest:          []? Remain off Work:       []? May return to full duty work:                                               []? Return to work with restrictions:                  Written patient dismissal instructions given to patient or POA.          Electronically signed by Red Sprague MD on 12/2/2021 at 9:27 AM

## 2021-12-16 ENCOUNTER — HOSPITAL ENCOUNTER (OUTPATIENT)
Dept: WOUND CARE | Age: 46
Discharge: HOME | End: 2021-12-16
Payer: MEDICAID

## 2021-12-16 VITALS
RESPIRATION RATE: 18 BRPM | TEMPERATURE: 98.5 F | SYSTOLIC BLOOD PRESSURE: 146 MMHG | HEART RATE: 80 BPM | DIASTOLIC BLOOD PRESSURE: 81 MMHG

## 2021-12-16 PROCEDURE — 74011000250 HC RX REV CODE- 250: Performed by: ORTHOPAEDIC SURGERY

## 2021-12-16 PROCEDURE — 99212 OFFICE O/P EST SF 10 MIN: CPT

## 2021-12-16 RX ORDER — LIDOCAINE HYDROCHLORIDE 20 MG/ML
15 SOLUTION OROPHARYNGEAL AS NEEDED
Status: DISCONTINUED | OUTPATIENT
Start: 2021-12-16 | End: 2021-12-18 | Stop reason: HOSPADM

## 2021-12-16 NOTE — WOUND CARE
Discharge Instructions for  [facility]  Telephone: [phone]    NAME:  Cheyenne Bonner OF BIRTH:  1975  MEDICAL RECORD NUMBER:  [id number]  DATE:  12/16/2021      Return Appointment:  [] Dressing supply provider:   [] Home Healthcare:  [x] Return Appointment: 4 Week(s)  [] Nurse Visit:    [facility])    [] Discharge from [facility]  [] Ordered tests:    [x] Referrals: Dr. Parth Valiente- keep appointment  [x] Rx: compression pump ordered COMPANY IS  [address] LIST TO ORDER FROM. Wound Cleansing:   Do not scrub or use excessive force. Cleanse wound prior to applying a clean dressing with:  [x] Normal Saline   [x] Mild Soap & Water    [] Keep Wound Dry in Shower  [] Other:      Topical Treatments:  Do not apply lotions, creams, or ointments to wound bed unless directed. [x] Apply moisturizing lotion to skin surrounding the wound prior to dressing change.  [] Apply antifungal ointment to skin surrounding the wound prior to dressing change.  [] Apply thin film of moisture barrier ointment to skin immediately around wound. [] Other:        Dressings:           Wound Location right leg   [] Apply Primary Dressing:       [] MediHoney Gel    [] MediHoney Alginate               [] Calcium Alginate      [] Calcium Alginate with Silver   [] Collagen                   [] Collagen with Silver                [] Santyl with Moistened saline gauze              [] Hydrofera Blue (cut to size and moistened)  [] Hydrofera Blue Ready (Cut to size)   [] NS wet to dry    [] Betadine wet to dry              [] Hydrogel                [] Mepitel     [] Bactroban/Mupirocin               [x] Other: silvercel    [x] Cover and Secure with:     [x] Gauze [x] Tiffanie [] Kerlix   [] Foam [] Super Absorbant [] ABD     [] ACE Wrap            [] Other:    Avoid contact of tape with skin.     [x] Change dressing: [] Daily    [] Every Other Day [] Once per week   [] Twice per week   [x] Three times per week [] Do Not Change Dressing   [] Other:        Edema Control:  Apply: [x] Compression Stocking [x]Right Leg [x]Left Leg   [] Tubigrip []Right Leg Double Layer []Left Leg Double Layer      []Right Leg Single Layer []Left Leg Single Layer     [x] Elevate leg(s) above the level of the heart when sitting. [x] Avoid prolonged standing in one place. Compression:  Apply: [] Multilayer Compression Wrap: []RightLeg []Left Leg                                 []Profore  []Profore Lite             []Unna     [] Do not get leg(s) with wrap wet. If wraps become too tight call the center or completely remove the wrap. [] Elevate leg(s) above the level of the heart when sitting. [] Avoid prolonged standing in one place. Dietary:  [x] Diet as tolerated: [] Calorie Diabetic Diet: [x] No Added Salt:  [] Increase Protein: [] Other:     Activity:  [x] Activity as tolerated:    [] Patient has no activity restrictions       [] Strict Bedrest:   [] Remain off Work:       [] May return to full duty work:                                     [] Return to work with restrictions:                    [address] Information: Should you experience any significant changes in your wound(s) or have questions about your wound care, please contact Gala Cancino at [place] [address] 8:00 am - 4:30. If you need help with your wound outside of these hours and cannot wait until we are again available, contact your PCP or go to the hospital emergency room. PLEASE NOTE: IF YOU ARE UNABLE TO OBTAIN WOUND SUPPLIES, CONTINUE TO USE THE SUPPLIES YOU HAVE AVAILABLE UNTIL YOU ARE ABLE TO REACH US. IT IS MOST IMPORTANT TO KEEP THE WOUND COVERED AT ALL TIMES.     [] Dr. Lucia Quinteros   [] Dr. Soco Montoya  [x] Dr. Javy Monreal  [] Dr. Lake Garcia

## 2021-12-16 NOTE — WOUND CARE
[address]   Progress Note and Procedure Note     [address] RECORD NUMBER:  [id number]  AGE: 40 y.o. RACE WHITE/NON-  GENDER: female  : 1975  EPISODE DATE:  2021      Subjective:     Chief Complaint   Patient presents with    Wound Check     RIGHT LEG         HISTORY of PRESENT ILLNESS HPI    Marii Lewis is a 40 y.o. female who presents today for wound/ulcer evaluation.    History of Wound Context: Patient comes with persistent wounds, she had healed up and the wound of started come back again the swelling is less but she still open wound for the last 4 to 6 weeks, she has not got her pump yet, patient was seen by interventional radiologist, they found some perforators which they're going to close  Wound/Ulcer Pain Timing/Severity: None   Quality of pain: none  Severity:  0/ 10   Modifying Factors: None  Associated Signs/Symptoms: edema          PAST MEDICAL HISTORY    Past Medical History:   Diagnosis Date    Anxiety     Condyloma     H/O seasonal allergies     Leg ulcer, left ([address] 2019    [facility] at wound care center [facility] Migraines     Morbid obesity ([address]     Psychiatric disorder     anxiety    Trichomonal vulvovaginitis     Uterine polyp     Venous insufficiency 2020    diagnosed by Dr. Della Cox    Past Surgical History:   Procedure Laterality Date    HX  SECTION      [address] [place] [address] AND CURETTAGE      D&E for anecephale    HX GI      HX GYN  2013    hysteroscopy with polypectomy and ablation    HX HYSTEROSCOPY WITH ENDOMETRIAL ABLATION      HX ORTHOPAEDIC Bilateral 2005    ingrown toenail ablation       FAMILY HISTORY    Family History   Problem Relation Age of Onset    Nural tube defect Child        SOCIAL HISTORY    Social History     Tobacco Use    Smoking status: Current Every Day Smoker     Packs/day: 0.50     Years: 20.00     Pack years: 10.00    Smokeless tobacco: Never Used   Vaping Use    Vaping Use: Never used   Substance Use Topics    Alcohol use: No    Drug use: No       ALLERGIES    Allergies   Allergen Reactions    Wellbutrin [Bupropion Hcl] Hives       MEDICATIONS    Current Outpatient Medications on File Prior to Encounter   Medication Sig Dispense Refill    naproxen sodium (Aleve) 220 mg tablet Take 220 mg by mouth three (3) times daily (with meals). PRN       No current facility-administered medications on file prior to encounter. REVIEW OF SYSTEMS    A comprehensive review of systems was negative except for that written in the HPI. Objective:     Visit Vitals  BP (!) 146/81 (BP 1 Location: Left arm, BP Patient Position: At rest;Sitting)   Pulse 80   Temp 98.5 °F (36.9 °C)   Resp 18       Wt Readings from Last 3 Encounters:   06/03/21 131.5 kg (290 lb)   03/06/20 138.5 kg (305 lb 4 oz)   02/28/20 138.5 kg (305 lb 4 oz)       PHYSICAL EXAM    Constitutional: Patient is awake, ambulating with no devices , no acute distress  Head: normocephalic, atraumatic. Behavioral/Psych: patient is pleasant, cooperative, awake and alert  small wounds on the right leg, persistent edema, these are superficial wounds right leg,    Assessment:      Morbid obesity  Venous insufficiency bilateral  Venous ulcer right side  Lymphedema bilateral    Plan:     Treatment Note please see attached Discharge Instructions  Return Appointment:  []? Dressing supply provider:   []? Home Healthcare:  [x]? Return Appointment: 4 Week(s)  []? Nurse Visit:    Week(s)     []? Discharge from [facility]  []? Ordered tests:    [x]? Referrals: Dr. Dayana Concepcion- keep appointment  [x]? Rx: compression pump ordered COMPANY IS OUT OF NETWORK- INSURANCE COMPANY TO SEND LIST TO ORDER FROM.       Wound Cleansing:   Do not scrub or use excessive force. Cleanse wound prior to applying a clean dressing with:  [x]? Normal Saline          [x]? Mild Soap & Water    []? Keep Wound Dry in Shower  []?  Other:    Topical Treatments:  Do not apply lotions, creams, or ointments to wound bed unless directed. [x]? Apply moisturizing lotion to skin surrounding the wound prior to dressing change. []? Apply antifungal ointment to skin surrounding the wound prior to dressing change. []? Apply thin film of moisture barrier ointment to skin immediately around wound. []? Other:                   Dressings:                  Wound Location right leg     []? Apply Primary Dressing:                                          []? MediHoney Gel         []? MediHoney Alginate                     []? Calcium Alginate      []? Calcium Alginate with Silver              []? Collagen                   []? Collagen with Silver                []? Santyl with Moistened saline gauze              []? Hydrofera Blue (cut to size and moistened)  []? Hydrofera Blue Ready (Cut to size)              []? NS wet to dry            []? Betadine wet to dry              []? Hydrogel                   []? Mepitel                   []? Bactroban/Mupirocin                       [x]? Other: silvercel     [x]? Cover and Secure with:                   [x]? Gauze        [x]? Pipestem Lat           []? Kerlix              []? Foam          []? Super Absorbant    []? ABD                                      []? ACE Wrap            []? Other:               Avoid contact of tape with skin.     [x]? Change dressing:  []? Daily           []? Every Other Day    []? Once per week   []? Twice per week              [x]? Three times per week        []? Do Not Change Dressing    []? Other:                              Edema Control:  Apply:  [x]? Compression Stocking      [x]? Right Leg     [x]? Left Leg              []? Tubigrip      []? Right Leg Double Layer      []? Left Leg Double Layer                                      []?Right Leg Single Layer       []? Left Leg Single Layer                 [x]? Elevate leg(s) above the level of the heart when sitting. [x]? Avoid prolonged standing in one place.         Compression:  Apply:  []? Multilayer Compression Wrap:      []? RightLeg      []? Left Leg                                 []?Profore            []? Profore Lite             []?Unna                 []? Do not get leg(s) with wrap wet. If wraps become too tight call the center or completely remove the wrap. []? Elevate leg(s) above the level of the heart when sitting. []? Avoid prolonged standing in one place.         Dietary:  [x]? Diet as tolerated:   []? Calorie Diabetic Diet:         [x]? No Added Salt:  []? Increase Protein:   []? Other:              Activity:  [x]? Activity as tolerated:    []? Patient has no activity restrictions       []? Strict Bedrest:          []? Remain off Work:       []? May return to full duty work:                                               []? Return to work with restrictions:                Written patient dismissal instructions given to patient or POA.          Electronically signed by Nestor Ng MD on 12/16/2021 at 11:57 AM

## 2021-12-16 NOTE — DISCHARGE INSTRUCTIONS
Discharge Instructions for  [facility], [facility]  Telephone: [phone]    NAME:  Silvia Claire OF BIRTH:  1975  MEDICAL RECORD NUMBER:  [id number]  DATE:  12/16/2021      Return Appointment:  [] Dressing supply provider:   [] Home Healthcare:  [x] Return Appointment: 4 Week(s)  [] Nurse Visit:    [facility])    [] Discharge from [facility]  [] Ordered tests:    [x] Referrals: Dr. Edward Saavedra- keep appointment  [x] Rx: compression pump ordered COMPANY IS  [address] LIST TO ORDER FROM. Wound Cleansing:   Do not scrub or use excessive force. Cleanse wound prior to applying a clean dressing with:  [x] Normal Saline   [x] Mild Soap & Water    [] Keep Wound Dry in Shower  [] Other:      Topical Treatments:  Do not apply lotions, creams, or ointments to wound bed unless directed. [x] Apply moisturizing lotion to skin surrounding the wound prior to dressing change.  [] Apply antifungal ointment to skin surrounding the wound prior to dressing change.  [] Apply thin film of moisture barrier ointment to skin immediately around wound. [] Other:        Dressings:           Wound Location right leg   [] Apply Primary Dressing:       [] MediHoney Gel    [] MediHoney Alginate               [] Calcium Alginate      [] Calcium Alginate with Silver   [] Collagen                   [] Collagen with Silver                [] Santyl with Moistened saline gauze              [] Hydrofera Blue (cut to size and moistened)  [] Hydrofera Blue Ready (Cut to size)   [] NS wet to dry    [] Betadine wet to dry              [] Hydrogel                [] Mepitel     [] Bactroban/Mupirocin               [x] Other: silvercel    [x] Cover and Secure with:     [x] Gauze [x] Tiffanie [] Kerlix   [] Foam [] Super Absorbant [] ABD     [] ACE Wrap            [] Other:    Avoid contact of tape with skin.     [x] Change dressing: [] Daily    [] Every Other Day [] Once per week   [] Twice per week   [x] Three times per week [] Do Not Change Dressing   [] Other:        Edema Control:  Apply: [x] Compression Stocking [x]Right Leg [x]Left Leg   [] Tubigrip []Right Leg Double Layer []Left Leg Double Layer      []Right Leg Single Layer []Left Leg Single Layer     [x] Elevate leg(s) above the level of the heart when sitting. [x] Avoid prolonged standing in one place. Compression:  Apply: [] Multilayer Compression Wrap: []RightLeg []Left Leg                                 []Profore  []Profore Lite             []Unna     [] Do not get leg(s) with wrap wet. If wraps become too tight call the center or completely remove the wrap. [] Elevate leg(s) above the level of the heart when sitting. [] Avoid prolonged standing in one place. Dietary:  [x] Diet as tolerated: [] Calorie Diabetic Diet: [x] No Added Salt:  [] Increase Protein: [] Other:     Activity:  [x] Activity as tolerated:    [] Patient has no activity restrictions       [] Strict Bedrest:   [] Remain off Work:       [] May return to full duty work:                                     [] Return to work with restrictions:                    [address] Information: Should you experience any significant changes in your wound(s) or have questions about your wound care, please contact Gala Cancino at [place] [address] 8:00 am - 4:30. If you need help with your wound outside of these hours and cannot wait until we are again available, contact your PCP or go to the hospital emergency room. PLEASE NOTE: IF YOU ARE UNABLE TO OBTAIN WOUND SUPPLIES, CONTINUE TO USE THE SUPPLIES YOU HAVE AVAILABLE UNTIL YOU ARE ABLE TO REACH US. IT IS MOST IMPORTANT TO KEEP THE WOUND COVERED AT ALL TIMES.     [] Dr. Regulo Alexander   [] Dr. Clover Nazario  [x] Dr. Paulina Gastelum  [] Dr. Donis Leggett

## 2022-01-13 ENCOUNTER — HOSPITAL ENCOUNTER (OUTPATIENT)
Dept: WOUND CARE | Age: 47
Discharge: HOME | End: 2022-01-13
Payer: MEDICAID

## 2022-01-13 VITALS
RESPIRATION RATE: 18 BRPM | SYSTOLIC BLOOD PRESSURE: 136 MMHG | DIASTOLIC BLOOD PRESSURE: 82 MMHG | TEMPERATURE: 98.1 F | HEART RATE: 92 BPM

## 2022-01-13 DIAGNOSIS — S81.801D WOUND OF RIGHT LEG, SUBSEQUENT ENCOUNTER: ICD-10-CM

## 2022-01-13 DIAGNOSIS — S81.802A WOUND OF LEFT LOWER EXTREMITY, INITIAL ENCOUNTER: Primary | ICD-10-CM

## 2022-01-13 PROCEDURE — 74011000250 HC RX REV CODE- 250: Performed by: ORTHOPAEDIC SURGERY

## 2022-01-13 PROCEDURE — 99212 OFFICE O/P EST SF 10 MIN: CPT

## 2022-01-13 RX ORDER — LIDOCAINE HYDROCHLORIDE 20 MG/ML
15 SOLUTION OROPHARYNGEAL AS NEEDED
Status: DISCONTINUED | OUTPATIENT
Start: 2022-01-13 | End: 2022-01-15 | Stop reason: HOSPADM

## 2022-01-13 NOTE — WOUND CARE
Discharge Instructions for  [address] [place] [address], [address] [place] [address]  Telephone: [phone]    NAME:  Oswaldo Chris OF BIRTH:  1975  MEDICAL RECORD NUMBER:  [id number]  DATE:  1/13/2022      Return Appointment:  [] Dressing supply provider:   [] Home Healthcare:  [x] Return Appointment: 4 Week(s)  [] Nurse Visit:    Week(s)    [] Discharge from [facility]  [x] Ordered tests:  HGA1C TO BE DONE AT [facility] NEXT VISIT  [x] Referrals: Dr. Jerrell Manzanares- keep appointment  [] Rx:      Wound Cleansing:   Do not scrub or use excessive force. Cleanse wound prior to applying a clean dressing with:  [x] Normal Saline   [x] Mild Soap & Water    [] Keep Wound Dry in Shower  [] Other:      Topical Treatments:  Do not apply lotions, creams, or ointments to wound bed unless directed. [x] Apply moisturizing lotion to skin surrounding the wound prior to dressing change.  [] Apply antifungal ointment to skin surrounding the wound prior to dressing change.  [] Apply thin film of moisture barrier ointment to skin immediately around wound. [] Other:        Dressings:           Wound Location RIGHT AND LEFT LEG  [] Apply Primary Dressing:       [] MediHoney Gel    [] MediHoney Alginate               [] Calcium Alginate      [] Calcium Alginate with Silver   [] Collagen                   [] Collagen with Silver                [] Santyl with Moistened saline gauze              [] Hydrofera Blue (cut to size and moistened)  [] Hydrofera Blue Ready (Cut to size)   [] NS wet to dry    [] Betadine wet to dry              [] Hydrogel                [] Mepitel     [] Bactroban/Mupirocin               [x] Other: silvercel    [x] Cover and Secure with:     [x] Gauze [x] Tiffanie [] Kerlix   [] Foam [] Super Absorbant [] ABD     [] ACE Wrap            [] Other:    Avoid contact of tape with skin.     [x] Change dressing: [] Daily    [] Every Other Day [] Once per week   [] Twice per week   [x] Three times per week [] Do Not Change Dressing   [] Other:        Edema Control:  Apply: [x] Compression Stocking [x]Right Leg [x]Left Leg   [] Tubigrip []Right Leg Double Layer []Left Leg Double Layer      []Right Leg Single Layer []Left Leg Single Layer     [x] Elevate leg(s) above the level of the heart when sitting. [x] Avoid prolonged standing in one place. Compression:  Apply: [] Multilayer Compression Wrap: []RightLeg []Left Leg                                 []Profore  []Profore Lite             []Unna     [] Do not get leg(s) with wrap wet. If wraps become too tight call the center or completely remove the wrap. [] Elevate leg(s) above the level of the heart when sitting. [] Avoid prolonged standing in one place. Dietary:  [x] Diet as tolerated: [] Calorie Diabetic Diet: [x] No Added Salt:  [] Increase Protein: [] Other:     Activity:  [x] Activity as tolerated:    [] Patient has no activity restrictions       [] Strict Bedrest:   [] Remain off Work:       [] May return to full duty work:                                     [] Return to work with restrictions:                    [address] [facility] Information: Should you experience any significant changes in your wound(s) or have questions about your wound care, please contact Gala Negron 26 at [place] [address] 8:00 am - 4:30. If you need help with your wound outside of these hours and cannot wait until we are again available, contact your PCP or go to the hospital emergency room. PLEASE NOTE: IF YOU ARE UNABLE TO OBTAIN WOUND SUPPLIES, CONTINUE TO USE THE SUPPLIES YOU HAVE AVAILABLE UNTIL YOU ARE ABLE TO REACH US. IT IS MOST IMPORTANT TO KEEP THE WOUND COVERED AT ALL TIMES.     [] Dr. Maggy Villavicencio   [x] Dr. Ashish Zarate  [] Dr. Isaiah Sanchez

## 2022-01-13 NOTE — DISCHARGE INSTRUCTIONS
Discharge Instructions for  [address] [place] [address], [address] [place] [address]  Telephone: [phone]    NAME:  Donney Sicard OF BIRTH:  1975  MEDICAL RECORD NUMBER:  [id number]  DATE:  1/13/2022      Return Appointment:  [] Dressing supply provider:   [] Home Healthcare:  [x] Return Appointment: 4 Week(s)  [] Nurse Visit:    Week(s)    [] Discharge from [facility]  [x] Ordered tests:  HGA1C TO BE DONE AT [facility] NEXT VISIT  [x] Referrals: Dr. Mandy Urias- keep appointment  [] Rx:      Wound Cleansing:   Do not scrub or use excessive force. Cleanse wound prior to applying a clean dressing with:  [x] Normal Saline   [x] Mild Soap & Water    [] Keep Wound Dry in Shower  [] Other:      Topical Treatments:  Do not apply lotions, creams, or ointments to wound bed unless directed. [x] Apply moisturizing lotion to skin surrounding the wound prior to dressing change.  [] Apply antifungal ointment to skin surrounding the wound prior to dressing change.  [] Apply thin film of moisture barrier ointment to skin immediately around wound. [] Other:        Dressings:           Wound Location RIGHT AND LEFT LEG  [] Apply Primary Dressing:       [] MediHoney Gel    [] MediHoney Alginate               [] Calcium Alginate      [] Calcium Alginate with Silver   [] Collagen                   [] Collagen with Silver                [] Santyl with Moistened saline gauze              [] Hydrofera Blue (cut to size and moistened)  [] Hydrofera Blue Ready (Cut to size)   [] NS wet to dry    [] Betadine wet to dry              [] Hydrogel                [] Mepitel     [] Bactroban/Mupirocin               [x] Other: silvercel    [x] Cover and Secure with:     [x] Gauze [x] Tiffanie [] Kerlix   [] Foam [] Super Absorbant [] ABD     [] ACE Wrap            [] Other:    Avoid contact of tape with skin.     [x] Change dressing: [] Daily    [] Every Other Day [] Once per week   [] Twice per week   [x] Three times per week [] Do Not Change Dressing   [] Other:        Edema Control:  Apply: [x] Compression Stocking [x]Right Leg [x]Left Leg   [] Tubigrip []Right Leg Double Layer []Left Leg Double Layer      []Right Leg Single Layer []Left Leg Single Layer     [x] Elevate leg(s) above the level of the heart when sitting. [x] Avoid prolonged standing in one place. Compression:  Apply: [] Multilayer Compression Wrap: []RightLeg []Left Leg                                 []Profore  []Profore Lite             []Unna     [] Do not get leg(s) with wrap wet. If wraps become too tight call the center or completely remove the wrap. [] Elevate leg(s) above the level of the heart when sitting. [] Avoid prolonged standing in one place. Dietary:  [x] Diet as tolerated: [] Calorie Diabetic Diet: [x] No Added Salt:  [] Increase Protein: [] Other:     Activity:  [x] Activity as tolerated:    [] Patient has no activity restrictions       [] Strict Bedrest:   [] Remain off Work:       [] May return to full duty work:                                     [] Return to work with restrictions:                    [address] Information: Should you experience any significant changes in your wound(s) or have questions about your wound care, please contact Gala Negron 26 at [place] [address] 8:00 am - 4:30. If you need help with your wound outside of these hours and cannot wait until we are again available, contact your PCP or go to the hospital emergency room. PLEASE NOTE: IF YOU ARE UNABLE TO OBTAIN WOUND SUPPLIES, CONTINUE TO USE THE SUPPLIES YOU HAVE AVAILABLE UNTIL YOU ARE ABLE TO REACH US. IT IS MOST IMPORTANT TO KEEP THE WOUND COVERED AT ALL TIMES.     [] Dr. Maggy Villavicencio   [x] Dr. Ashish Zarate  [] Dr. Isaiah Sanchez

## 2022-01-13 NOTE — WOUND CARE
[address]   Progress Note and Procedure Note     [address] RECORD NUMBER:  [id number]  AGE: 40 y.o. RACE WHITE/NON-  GENDER: female  : 1975  EPISODE DATE:  2022      Subjective:     Chief Complaint   Patient presents with    Wound Check     right and left lower legs         HISTORY of PRESENT ILLNESS HPI    Marlo Meza is a 40 y.o. female who presents today for wound/ulcer evaluation.    History of Wound Context: Patient has undergone venous ablation on the right side, feels a little better, wound is little smaller, has developed new wounds on the left leg, started about 2 to 3 weeks ago, gradually getting worse, multiple small papular lesions with dryness of skin came with small wounds,  Wound/Ulcer Pain Timing/Severity: None   Quality of pain: none  Severity:  0/ 10   Modifying Factors: None  Associated Signs/Symptoms: edema          PAST MEDICAL HISTORY    Past Medical History:   Diagnosis Date    Anxiety     Condyloma     H/O seasonal allergies     Leg ulcer, left ([facility] [address] 2019    [facility] at wound care center [facility] Migraines     Morbid obesity ([facility] [address]     Psychiatric disorder     anxiety    Trichomonal vulvovaginitis     Uterine polyp     Venous insufficiency 2020    diagnosed by Dr. Robin Vick    Past Surgical History:   Procedure Laterality Date    HX  SECTION      [address] [place] [address] AND CURETTAGE      D&E for anecephale    HX GI      HX GYN  2013    hysteroscopy with polypectomy and ablation    HX HYSTEROSCOPY WITH ENDOMETRIAL ABLATION      HX ORTHOPAEDIC Bilateral 2005    ingrown toenail ablation       FAMILY HISTORY    Family History   Problem Relation Age of Onset    Nural tube defect Child        SOCIAL HISTORY    Social History     Tobacco Use    Smoking status: Current Every Day Smoker     Packs/day: 0.50     Years: 20.00     Pack years: 10.00    Smokeless tobacco: Never Used Vaping Use    Vaping Use: Never used   Substance Use Topics    Alcohol use: No    Drug use: No       ALLERGIES    Allergies   Allergen Reactions    Wellbutrin [Bupropion Hcl] Hives       MEDICATIONS    Current Outpatient Medications on File Prior to Encounter   Medication Sig Dispense Refill    naproxen sodium (Aleve) 220 mg tablet Take 220 mg by mouth three (3) times daily (with meals). PRN       No current facility-administered medications on file prior to encounter. REVIEW OF SYSTEMS    A comprehensive review of systems was negative except for that written in the HPI. Objective:     Visit Vitals  /82 (BP 1 Location: Right upper arm, BP Patient Position: At rest;Sitting)   Pulse 92   Temp 98.1 °F (36.7 °C)   Resp 18       Wt Readings from Last 3 Encounters:   06/03/21 131.5 kg (290 lb)   03/06/20 138.5 kg (305 lb 4 oz)   02/28/20 138.5 kg (305 lb 4 oz)       PHYSICAL EXAM    Constitutional: Patient is awake, ambulating with no devices , no acute distress  Head: normocephalic, atraumatic. Behavioral/Psych: patient is pleasant, cooperative, awake and alert    Debridement Wound Care        Problem List Items Addressed This Visit     None      Visit Diagnoses     Wound of left lower extremity, initial encounter    -  Primary    Relevant Orders    CBC WITH AUTOMATED DIFF    METABOLIC PANEL, COMPREHENSIVE    HEMOGLOBIN A1C W/O EAG    FERRITIN    Wound of right leg, subsequent encounter        Relevant Orders    CBC WITH AUTOMATED DIFF    METABOLIC PANEL, COMPREHENSIVE    HEMOGLOBIN A1C W/O EAG    FERRITIN              Wound Leg lower Right;Medial #1 (Active)   Wound Image   01/13/22 0952   Wound Etiology Venous 01/13/22 0944   Dressing Status Clean;Dry; Intact 01/13/22 0944   Cleansed Cleansed with saline 01/13/22 0944   Dressing/Treatment Dry dressing 12/02/21 0852   Offloading for Diabetic Foot Ulcers No offloading required 12/16/21 0938   Wound Length (cm) 1.5 cm 01/13/22 0944   Wound Width (cm) 0.5 cm 01/13/22 0944   Wound Depth (cm) 0.1 cm 01/13/22 0944   Wound Surface Area (cm^2) 0.75 cm^2 01/13/22 0944   Change in Wound Size % 37.5 01/13/22 0944   Wound Volume (cm^3) 0.075 cm^3 01/13/22 0944   Wound Healing % 84 01/13/22 0944   Post-Procedure Length (cm) 3.7 cm 12/02/21 0848   Post-Procedure Width (cm) 1.5 cm 12/02/21 0848   Post-Procedure Depth (cm) 0.3 cm 12/02/21 0848   Post-Procedure Surface Area (cm^2) 5.55 cm^2 12/02/21 0848   Post-Procedure Volume (cm^3) 1.665 cm^3 12/02/21 0848   Wound Assessment Granulation tissue 01/13/22 0944   Drainage Amount None 12/16/21 0938   Drainage Description Serosanguinous 01/13/22 0944   Wound Odor None 01/13/22 0944   Rosalva-Wound/Incision Assessment Intact 01/13/22 0952   Edges Attached edges 01/13/22 0944   Wound Thickness Description Partial thickness 01/13/22 0952   Number of days: 224       Wound Leg lower Left;Posterior #2 01/13/22 (Active)   Wound Image   01/13/22 0948   Wound Etiology Venous 01/13/22 0948   Dressing Status Clean;Dry; Intact 01/13/22 0948   Cleansed Cleansed with saline 01/13/22 0948   Wound Length (cm) 2 cm 01/13/22 0948   Wound Width (cm) 1 cm 01/13/22 0948   Wound Depth (cm) 0.1 cm 01/13/22 0948   Wound Surface Area (cm^2) 2 cm^2 01/13/22 0948   Wound Volume (cm^3) 0.2 cm^3 01/13/22 0948   Wound Assessment Granulation tissue 01/13/22 0948   Drainage Amount Scant 01/13/22 0948   Drainage Description Serosanguinous 01/13/22 0948   Wound Odor None 01/13/22 0948   Rosalva-Wound/Incision Assessment Intact 01/13/22 0948   Edges Attached edges 01/13/22 0948   Wound Thickness Description Partial thickness 01/13/22 0948   Number of days: 0            Assessment:      Problem List Items Addressed This Visit     None      Visit Diagnoses     Wound of left lower extremity, initial encounter    -  Primary    Relevant Orders    CBC WITH AUTOMATED DIFF    METABOLIC PANEL, COMPREHENSIVE    HEMOGLOBIN A1C W/O EAG    FERRITIN    Wound of right leg, subsequent encounter        Relevant Orders    CBC WITH AUTOMATED DIFF    METABOLIC PANEL, COMPREHENSIVE    HEMOGLOBIN A1C W/O EAG    FERRITIN          Other diagnoses include venous insufficiency bilateral, tobacco abuse, morbid obesity    Plan:     Treatment Note please see attached Discharge Instructions, patient has not had recent blood work, will get new set of labs  Return Appointment:  []? Dressing supply provider:   []? Home Healthcare:  [x]? Return Appointment: 4 Week(s)  []? Nurse Visit:    Week(s)     []? Discharge from [facility]  [x]? Ordered tests:  HGA1C TO BE DONE AT [facility] PRIOR TO NEXT VISIT  [x]? Referrals: Dr. Srinivas Adam- keep appointment  []? Rx:        Wound Cleansing:   Do not scrub or use excessive force. Cleanse wound prior to applying a clean dressing with:  [x]? Normal Saline          [x]? Mild Soap & Water    []? Keep Wound Dry in Shower  []? Other:       Topical Treatments:  Do not apply lotions, creams, or ointments to wound bed unless directed. [x]? Apply moisturizing lotion to skin surrounding the wound prior to dressing change. []? Apply antifungal ointment to skin surrounding the wound prior to dressing change. []? Apply thin film of moisture barrier ointment to skin immediately around wound. []? Other:                   Dressings:                  Wound Location RIGHT AND LEFT LEG  []? Apply Primary Dressing:                                          []? MediHoney Gel         []? MediHoney Alginate                     []? Calcium Alginate      []? Calcium Alginate with Silver              []? Collagen                   []? Collagen with Silver                []? Santyl with Moistened saline gauze              []? Hydrofera Blue (cut to size and moistened)  []? Hydrofera Blue Ready (Cut to size)              []? NS wet to dry            []?  Betadine wet to dry              []? Hydrogel                   []? Mepitel                   []? [employer] [x]? Other: silvercel     [x]? Cover and Secure with:                   [x]? Gauze        [x]? Almon Rosalva           []? Kerlix              []? Foam          []? Super Absorbant    []? ABD                                      []? ACE Wrap            []? Other:               Avoid contact of tape with skin.     [x]? Change dressing:  []? Daily           []? Every Other Day    []? Once per week   []? Twice per week              [x]? Three times per week        []? Do Not Change Dressing    []? Other:                              Edema Control:  Apply:  [x]? Compression Stocking      [x]? Right Leg     [x]? Left Leg              []? Tubigrip      []? Right Leg Double Layer      []? Left Leg Double Layer                                      []?Right Leg Single Layer       []? Left Leg Single Layer                 [x]? Elevate leg(s) above the level of the heart when sitting. [x]? Avoid prolonged standing in one place.         Compression:  Apply:  []? Multilayer Compression Wrap:      []? RightLeg      []? Left Leg                                 []?Profore            []? Profore Lite             []?Unna                 []? Do not get leg(s) with wrap wet. If wraps become too tight call the center or completely remove the wrap. []? Elevate leg(s) above the level of the heart when sitting. []? Avoid prolonged standing in one place.         Dietary:  [x]? Diet as tolerated:   []? Calorie Diabetic Diet:         [x]? No Added Salt:  []? Increase Protein:   []? Other:              Activity:  [x]? Activity as tolerated:    []? Patient has no activity restrictions       []? Strict Bedrest:          []? Remain off Work:       []? May return to full duty work:                                               []? Return to work with restrictions:                Written patient dismissal instructions given to patient or POA.          Electronically signed by Toño Bolanos MD on 1/13/2022 at 10:12 AM

## 2022-01-13 NOTE — WOUND CARE
[address] [facility]:     [address] f: [phone] f: [phone] p: [phone] [email or web address]     Ordering Center:     [address]  [facility]  [address] [id number] [phone]  WOUND CARE [unfilled]   FAX NUMBER [unfilled]    Patient Information:      [address] [place] [id number]   [unfilled]   : 1975  AGE: 40 y.o. GENDER: female   TODAYS DATE:  2022    Insurance:      PRIMARY INSURANCE:  [id number] - (Medicaid)    Payor/Plan Subscr  Sex Relation Sub. Ins. ID Effective Group Num   1. BLUE CROSS Ciaran Tannerth 1975 Female Self [id number] 21 [id number]                                   [address] [address]   2. [address] [place] [address] [address] 1975 Female Self [id number]                                   [address] [address]       Patient Wound Information:      Problem List Items Addressed This Visit     None      Visit Diagnoses     Wound of left lower extremity, initial encounter    -  Primary    Relevant Orders    CBC WITH AUTOMATED DIFF    METABOLIC PANEL, COMPREHENSIVE    HEMOGLOBIN A1C W/O EAG    FERRITIN    Wound of right leg, subsequent encounter        Relevant Orders    CBC WITH AUTOMATED DIFF    METABOLIC PANEL, COMPREHENSIVE    HEMOGLOBIN A1C W/O EAG    FERRITIN          WOUNDS REQUIRING DRESSING SUPPLIES:     Wound Leg lower Right;Medial #1 (Active)   Wound Image   22 0952   Wound Etiology Venous 22 0944   Dressing Status Clean;Dry; Intact 22 0944   Cleansed Cleansed with saline 22 0944   Dressing/Treatment Dry dressing 21 0852   Offloading for Diabetic Foot Ulcers No offloading required 21 0938   Wound Length (cm) 1.5 cm 22 0944   Wound Width (cm) 0.5 cm 22 0944   Wound Depth (cm) 0.1 cm 22 0944   Wound Surface Area (cm^2) 0.75 cm^2 22 0944   Change in Wound Size % 37.5 01/13/22 0944   Wound Volume (cm^3) 0.075 cm^3 01/13/22 0944   Wound Healing % 84 01/13/22 0944   Post-Procedure Length (cm) 3.7 cm 12/02/21 0848   Post-Procedure Width (cm) 1.5 cm 12/02/21 0848   Post-Procedure Depth (cm) 0.3 cm 12/02/21 0848   Post-Procedure Surface Area (cm^2) 5.55 cm^2 12/02/21 0848   Post-Procedure Volume (cm^3) 1.665 cm^3 12/02/21 0848   Wound Assessment Granulation tissue 01/13/22 0944   Drainage Amount None 12/16/21 0938   Drainage Description Serosanguinous 01/13/22 0944   Wound Odor None 01/13/22 0944   Rosalva-Wound/Incision Assessment Intact 01/13/22 0952   Edges Attached edges 01/13/22 0944   Wound Thickness Description Partial thickness 01/13/22 0952   Number of days: 224       Wound Leg lower Left;Posterior #2 01/13/22 (Active)   Wound Image   01/13/22 0948   Wound Etiology Venous 01/13/22 0948   Dressing Status Clean;Dry; Intact 01/13/22 0948   Cleansed Cleansed with saline 01/13/22 0948   Wound Length (cm) 2 cm 01/13/22 0948   Wound Width (cm) 1 cm 01/13/22 0948   Wound Depth (cm) 0.1 cm 01/13/22 0948   Wound Surface Area (cm^2) 2 cm^2 01/13/22 0948   Wound Volume (cm^3) 0.2 cm^3 01/13/22 0948   Wound Assessment Granulation tissue 01/13/22 0948   Drainage Amount Scant 01/13/22 0948   Drainage Description Serosanguinous 01/13/22 0948   Wound Odor None 01/13/22 0948   Rosalva-Wound/Incision Assessment Intact 01/13/22 0948   Edges Attached edges 01/13/22 0948   Wound Thickness Description Partial thickness 01/13/22 0948   Number of days: 0        Supplies Requested :      WOUND #:1   PRIMARY DRESSING:  None   Conforming roll gauze and Other  4 INCH CONFORMING ROLL GAUZE     FREQUENCY OF DRESSING CHANGES:  Every other day            ADDITIONAL ITEMS:  [] Gloves Small  [] Gloves Medium [] Gloves Large [] Gloves XLarge  [] Tape 1\" [] Tape 2\" [] Tape 3\"  [] Medipore Tape  [] Saline  [] Skin Prep   [] Adhesive Remover   [] Cotton Tip Applicators   [] Other:    Patient Wound(s) Debrided: [x] Yes if yes please add date  12/2/2021   [] No    Debribement Type: Excisional/Sharp    Patient currently being seen by Home Health: [] Yes   [x] No    Duration for needed supplies:  []15  [x]30  []60  []90 Days    Electronically signed by Juan Bowers RN on 1/13/2022 at 10:27 AM    Provider Information:      PROVIDER'S NAME: DR DOW [facility]     NPI:

## 2022-02-24 ENCOUNTER — HOSPITAL ENCOUNTER (OUTPATIENT)
Dept: WOUND CARE | Age: 47
Discharge: HOME | End: 2022-02-24
Payer: MEDICAID

## 2022-02-24 VITALS
TEMPERATURE: 97.8 F | DIASTOLIC BLOOD PRESSURE: 71 MMHG | HEART RATE: 95 BPM | RESPIRATION RATE: 18 BRPM | SYSTOLIC BLOOD PRESSURE: 155 MMHG

## 2022-02-24 DIAGNOSIS — L97.921 ULCER OF LEFT LOWER EXTREMITY, LIMITED TO BREAKDOWN OF SKIN (HCC): ICD-10-CM

## 2022-02-24 PROBLEM — L97.929 LEG ULCER, LEFT (HCC): Status: ACTIVE | Noted: 2019-06-01

## 2022-02-24 PROCEDURE — 99211 OFF/OP EST MAY X REQ PHY/QHP: CPT

## 2022-02-24 NOTE — WOUND CARE
[address]   Progress Note and Procedure Note     [address] RECORD NUMBER:  [id number]  AGE: 40 y.o. RACE WHITE/NON-  GENDER: female  : 1975  EPISODE DATE:  2022      Subjective:     Chief Complaint   Patient presents with    Wound Check     right and left lower leg         HISTORY of PRESENT ILLNESS HPI    Ismael Urbina is a 40 y.o. female who presents today for wound/ulcer evaluation.    History of Wound Context: Patient comes for evaluation, she is doing well, she has had venous ablations done that has really helped her ulcers which are healing, there is swelling superficial excoriation left on the left side, the right side is pretty much healed up, swelling is also much less  Wound/Ulcer Pain Timing/Severity: None   Quality of pain: none  Severity:  0/ 10   Modifying Factors: Pain worsens with walking  Associated Signs/Symptoms: edema          PAST MEDICAL HISTORY    Past Medical History:   Diagnosis Date    Anxiety     Condyloma     H/O seasonal allergies     Leg ulcer, left ([address] 2019    [facility] at wound care center [facility] Migraines     Morbid obesity ([facility] [address]     Psychiatric disorder     anxiety    Trichomonal vulvovaginitis     Uterine polyp     Venous insufficiency 2020    diagnosed by Dr. Khalif Childers    Past Surgical History:   Procedure Laterality Date    HX  SECTION      [address] [place] [address] AND CURETTAGE      D&E for anecephale    HX GI      HX GYN  2013    hysteroscopy with polypectomy and ablation    HX HYSTEROSCOPY WITH ENDOMETRIAL ABLATION      HX ORTHOPAEDIC Bilateral 2005    ingrown toenail ablation       FAMILY HISTORY    Family History   Problem Relation Age of Onset    Nural tube defect Child        SOCIAL HISTORY    Social History     Tobacco Use    Smoking status: Current Every Day Smoker     Packs/day: 0.50     Years: 20.00     Pack years: 10.00    Smokeless tobacco: Never Used   Vaping Use    Vaping Use: Never used   Substance Use Topics    Alcohol use: No    Drug use: No       ALLERGIES    Allergies   Allergen Reactions    Wellbutrin [Bupropion Hcl] Hives       MEDICATIONS    Current Outpatient Medications on File Prior to Encounter   Medication Sig Dispense Refill    naproxen sodium (Aleve) 220 mg tablet Take 220 mg by mouth three (3) times daily (with meals). PRN       No current facility-administered medications on file prior to encounter. REVIEW OF SYSTEMS    A comprehensive review of systems was negative except for that written in the HPI. Objective:     Visit Vitals  BP (!) 155/71 (BP 1 Location: Left upper arm, BP Patient Position: At rest;Sitting)   Pulse 95   Temp 97.8 °F (36.6 °C)   Resp 18       Wt Readings from Last 3 Encounters:   06/03/21 131.5 kg (290 lb)   03/06/20 138.5 kg (305 lb 4 oz)   02/28/20 138.5 kg (305 lb 4 oz)       PHYSICAL EXAM    Constitutional: Patient is awake, ambulating with no devices , no acute distress  Head: normocephalic, atraumatic. Behavioral/Psych: patient is pleasant, cooperative, awake and alert patient has mild superficial rash but most of the wounds have healed, patient has edema in both legs but is much less than before    Assessment:      Hospital Problems  Date Reviewed: 1/13/2022          Codes Class Noted POA    Venous insufficiency of both lower extremities ICD-10-CM: I87.2  ICD-9-CM: 459.81  Unknown Yes        Leg ulcer, left ([address] ICD-10-CM: B83.910  ICD-9-CM: 707.10  6/2019 Yes    Overview Signed 2/24/2022 10:06 AM by Cinthia Archuleta MD     treated at wound care center [place]             Morbid obesity [facility]) ICD-10-CM: E66.01  ICD-9-CM: 278.01  11/30/2018 Yes               POP IN PROCEDURE TYPE (DEBRIDEMENT, BIOPSY, I&D, SKIN SUB, CHEMICAL CAUERTY)     Plan:     Treatment Note please see attached Discharge Instructions    Written patient dismissal instructions given to patient or POA.        Return Appointment:  []? Dressing supply provider:   []? Home Healthcare:  []? Return Appointment:  [facility])  []? Nurse Visit:    Week(s)     [x]? Discharge from [facility]  []? Ordered tests:   [x]? Referrals: Dr. Gely Yeh- keep appointment  Dr. Jaquelin Garvin  []? Rx:        Wound Cleansing:   Do not scrub or use excessive force. Cleanse wound prior to applying a clean dressing with:  []? Normal Saline          []? Mild Soap & Water    []? Keep Wound Dry in Shower  []? Other:       Topical Treatments:  Do not apply lotions, creams, or ointments to wound bed unless directed. []? Apply moisturizing lotion to skin surrounding the wound prior to dressing change. []? Apply antifungal ointment to skin surrounding the wound prior to dressing change. []? Apply thin film of moisture barrier ointment to skin immediately around wound. []? Other:                   Dressings:                  Wound Location RIGHT AND LEFT LEG  []? Apply Primary Dressing:                                          []? MediHoney Gel         []? MediHoney Alginate                     []? Calcium Alginate      []? Calcium Alginate with Silver              []? Collagen                   []? Collagen with Silver                []? Santyl with Moistened saline gauze              []? Hydrofera Blue (cut to size and moistened)  []? Hydrofera Blue Ready (Cut to size)              []? NS wet to dry            []? Betadine wet to dry              []? Hydrogel                   []? Mepitel                   []? Bactroban/Mupirocin                       []? Other: silvercel     []? Cover and Secure with:                   []? Gauze        [x]? Pamplin Munch           []? Kerlix              []? Foam          []? Super Absorbant    []? ABD                                      []? ACE Wrap            []? Other:               Avoid contact of tape with skin.     []? Change dressing:  []? Daily           []? Every Other Day    []? Once per week   []? Twice per week              []? Three times per week        []? Do Not Change Dressing    []? Other:                              Edema Control:  Apply:  [x]? Compression Stocking      [x]? Right Leg     [x]? Left Leg              []? Tubigrip      []? Right Leg Double Layer      []? Left Leg Double Layer                                      []?Right Leg Single Layer       []? Left Leg Single Layer                 [x]? Elevate leg(s) above the level of the heart when sitting. [x]? Avoid prolonged standing in one place.         Compression:  Apply:  []? Multilayer Compression Wrap:      []? RightLeg      []? Left Leg                                 []?Profore            []? Profore Lite             []?Unna                 []? Do not get leg(s) with wrap wet. If wraps become too tight call the center or completely remove the wrap. []? Elevate leg(s) above the level of the heart when sitting. []? Avoid prolonged standing in one place.         Dietary:  [x]? Diet as tolerated:   []? Calorie Diabetic Diet:         [x]? No Added Salt:  []? Increase Protein:   []? Other:              Activity:  [x]? Activity as tolerated:    []? Patient has no activity restrictions       []? Strict Bedrest:          []? Remain off Work:       []?  May return to full duty work:                                               []? Return to work with restrictions:                     Electronically signed by Natalie Larios MD on 2/24/2022 at 10:04 AM

## 2022-02-24 NOTE — DISCHARGE INSTRUCTIONS
Discharge Instructions for  [address] [place] [address], [facility]  Telephone: [phone]     NAME:  Layla Cameron OF BIRTH:  1975  MEDICAL RECORD NUMBER:  [id number]  DATE:  2/24/2022        Return Appointment:  []? Dressing supply provider:   []? Home Healthcare:  []? Return Appointment:  [facility])  []? Nurse Visit:    Week(s)     [x]? Discharge from [facility]  []? Ordered tests:   [x]? Referrals: Dr. Yoanna Diaz- keep appointment  Dr. Charbel Connors  []? Rx:        Wound Cleansing:   Do not scrub or use excessive force. Cleanse wound prior to applying a clean dressing with:  []? Normal Saline          []? Mild Soap & Water    []? Keep Wound Dry in Shower  []? Other:       Topical Treatments:  Do not apply lotions, creams, or ointments to wound bed unless directed. []? Apply moisturizing lotion to skin surrounding the wound prior to dressing change. []? Apply antifungal ointment to skin surrounding the wound prior to dressing change. []? Apply thin film of moisture barrier ointment to skin immediately around wound. []? Other:                   Dressings:                  Wound Location RIGHT AND LEFT LEG  []? Apply Primary Dressing:                                          []? MediHoney Gel         []? MediHoney Alginate                     []? Calcium Alginate      []? Calcium Alginate with Silver              []? Collagen                   []? Collagen with Silver                []? Santyl with Moistened saline gauze              []? Hydrofera Blue (cut to size and moistened)  []? Hydrofera Blue Ready (Cut to size)              []? NS wet to dry            []? Betadine wet to dry              []? Hydrogel                   []? Mepitel                   []? Bactroban/Mupirocin                       []? Other: silvercel     []? Cover and Secure with:                   []? Gauze        [x]? Lynda Pastures           []? Kerlix              []? Foam          []? Super Absorbant    []? ABD                                      []? ACE Wrap            []? Other:               Avoid contact of tape with skin.     []? Change dressing:  []? Daily           []? Every Other Day    []? Once per week   []? Twice per week              []? Three times per week        []? Do Not Change Dressing    []? Other:                              Edema Control:  Apply:  [x]? Compression Stocking      [x]? Right Leg     [x]? Left Leg              []? Tubigrip      []? Right Leg Double Layer      []? Left Leg Double Layer                                      []?Right Leg Single Layer       []? Left Leg Single Layer                 [x]? Elevate leg(s) above the level of the heart when sitting. [x]? Avoid prolonged standing in one place.         Compression:  Apply:  []? Multilayer Compression Wrap:      []? RightLeg      []? Left Leg                                 []?Profore            []? Profore Lite             []?Unna                 []? Do not get leg(s) with wrap wet. If wraps become too tight call the center or completely remove the wrap. []? Elevate leg(s) above the level of the heart when sitting. []? Avoid prolonged standing in one place.         Dietary:  [x]? Diet as tolerated:   []? Calorie Diabetic Diet:         [x]? No Added Salt:  []? Increase Protein:   []? Other:              Activity:  [x]? Activity as tolerated:    []? Patient has no activity restrictions       []? Strict Bedrest:          []? Remain off Work:       []? May return to full duty work:                                               []? Return to work with restrictions:      [address] Information: Should you experience any significant changes in your wound(s) or have questions about your wound care, please contact Gala Cancino at [place] [address] 8:00 am - 4:30.   If you need help with your wound outside of these hours and cannot wait until we are again available, contact your PCP or go to the hospital emergency room.      PLEASE NOTE: IF YOU ARE UNABLE TO [address], CONTINUE TO USE THE SUPPLIES YOU HAVE AVAILABLE UNTIL YOU ARE ABLE TO [facility]. IT IS MOST IMPORTANT TO KEEP THE WOUND COVERED AT ALL TIMES.     []? Dr. Shahla Biswas   [x]? Dr. Dinorah Coley  []?  Dr. Evangelina Blanchard

## 2022-02-24 NOTE — WOUND CARE
Discharge Instructions for  [address], [address]  Telephone: [phone]    NAME:  Oswaldo Chris OF BIRTH:  1975  MEDICAL RECORD NUMBER:  [id number]  DATE:  2/24/2022      Return Appointment:  [] Dressing supply provider:   [] Home Healthcare:  [] Return Appointment:  [facility])  [] Nurse Visit:    [facility])    [x] Discharge from [facility]  [] Ordered tests:   [x] Referrals: Dr. Jerrell Manzanares- keep appointment  Dr. Saira Newman  [] Rx:      Wound Cleansing:   Do not scrub or use excessive force. Cleanse wound prior to applying a clean dressing with:  [] Normal Saline   [] Mild Soap & Water    [] Keep Wound Dry in Shower  [] Other:      Topical Treatments:  Do not apply lotions, creams, or ointments to wound bed unless directed. [] Apply moisturizing lotion to skin surrounding the wound prior to dressing change.  [] Apply antifungal ointment to skin surrounding the wound prior to dressing change.  [] Apply thin film of moisture barrier ointment to skin immediately around wound. [] Other:        Dressings:           Wound Location RIGHT AND LEFT LEG  [] Apply Primary Dressing:       [] MediHoney Gel    [] MediHoney Alginate               [] Calcium Alginate      [] Calcium Alginate with Silver   [] Collagen                   [] Collagen with Silver                [] Santyl with Moistened saline gauze              [] Hydrofera Blue (cut to size and moistened)  [] Hydrofera Blue Ready (Cut to size)   [] NS wet to dry    [] Betadine wet to dry              [] Hydrogel                [] Mepitel     [] Bactroban/Mupirocin               [] Other: silvercel    [] Cover and Secure with:     [] Gauze [x] Tiffanie [] Kerlix   [] Foam [] Super Absorbant [] ABD     [] ACE Wrap            [] Other:    Avoid contact of tape with skin.     [] Change dressing: [] Daily    [] Every Other Day [] Once per week   [] Twice per week   [] Three times per week [] Do Not Change Dressing   [] Other:        Edema Control:  Apply: [x] Compression Stocking [x]Right Leg [x]Left Leg   [] Tubigrip []Right Leg Double Layer []Left Leg Double Layer      []Right Leg Single Layer []Left Leg Single Layer     [x] Elevate leg(s) above the level of the heart when sitting. [x] Avoid prolonged standing in one place. Compression:  Apply: [] Multilayer Compression Wrap: []RightLeg []Left Leg                                 []Profore  []Profore Lite             []Unna     [] Do not get leg(s) with wrap wet. If wraps become too tight call the center or completely remove the wrap. [] Elevate leg(s) above the level of the heart when sitting. [] Avoid prolonged standing in one place. Dietary:  [x] Diet as tolerated: [] Calorie Diabetic Diet: [x] No Added Salt:  [] Increase Protein: [] Other:     Activity:  [x] Activity as tolerated:    [] Patient has no activity restrictions       [] Strict Bedrest:   [] Remain off Work:       [] May return to full duty work:                                     [] Return to work with restrictions:                    [address] Information: Should you experience any significant changes in your wound(s) or have questions about your wound care, please contact Gala Cancino at [place] [address] 8:00 am - 4:30. If you need help with your wound outside of these hours and cannot wait until we are again available, contact your PCP or go to the hospital emergency room. PLEASE NOTE: IF YOU ARE UNABLE TO OBTAIN WOUND SUPPLIES, CONTINUE TO USE THE SUPPLIES YOU HAVE AVAILABLE UNTIL YOU ARE ABLE TO REACH US. IT IS MOST IMPORTANT TO KEEP THE WOUND COVERED AT ALL TIMES.     [] Dr. Mira Gallagher   [x] Dr. Beck Morelos  [] Dr. Diane Sanches

## 2022-03-19 PROBLEM — L97.929 LEG ULCER, LEFT (HCC): Status: ACTIVE | Noted: 2019-06-01

## 2022-03-20 PROBLEM — E66.01 MORBID OBESITY (HCC): Status: ACTIVE | Noted: 2018-11-30

## 2023-05-11 RX ORDER — NAPROXEN SODIUM 220 MG
TABLET ORAL
COMMUNITY

## 2024-03-06 ENCOUNTER — OFFICE VISIT (OUTPATIENT)
Age: 49
End: 2024-03-06
Payer: MEDICAID

## 2024-03-06 VITALS
SYSTOLIC BLOOD PRESSURE: 150 MMHG | HEIGHT: 71 IN | BODY MASS INDEX: 40.63 KG/M2 | WEIGHT: 290.2 LBS | DIASTOLIC BLOOD PRESSURE: 79 MMHG

## 2024-03-06 DIAGNOSIS — Z12.4 ENCOUNTER FOR PAPANICOLAOU SMEAR FOR CERVICAL CANCER SCREENING: ICD-10-CM

## 2024-03-06 DIAGNOSIS — Z01.419 ENCOUNTER FOR ANNUAL ROUTINE GYNECOLOGICAL EXAMINATION: Primary | ICD-10-CM

## 2024-03-06 PROCEDURE — 99396 PREV VISIT EST AGE 40-64: CPT | Performed by: OBSTETRICS & GYNECOLOGY

## 2024-03-06 NOTE — PROGRESS NOTES
Annual exam      Anais Callaway is a No obstetric history on file.,  48 y.o. female   No LMP recorded. Patient has had an ablation.    She presents for her annual checkup.     She is having possible prolapse symptoms.    Menstrual status:    No bleeding post ablation.    Sexual history:    She  reports being sexually active and has had partner(s) who are male. She reports using the following method of birth control/protection: Surgical.    Per Nursing Note:  Birth Control: ablation.  Last Pap: normal obtained in 2018 year(s) ago.  She does not have a history of DINA 2, 3 or cervical cancer.   Last Mammogram: has not had a recent mammogram.      Past Medical History:   Diagnosis Date    Anxiety     Condyloma     H/O seasonal allergies     Leg ulcer, left (HCC) 2019    treated at [facility]    Migraines     Morbid obesity ([facility])     Psychiatric disorder     anxiety    Trichomonal vulvovaginitis     Uterine polyp     Venous insufficiency 2020    diagnosed by Dr. Talamantes     Past Surgical History:   Procedure Laterality Date     SECTION  2006    DILATION AND CURETTAGE OF UTERUS      D&E for anecephale    ENDOMETRIAL ABLATION      GALLBLADDER SURGERY      GI      GYN  2013    hysteroscopy with polypectomy and ablation    ORTHOPEDIC SURGERY Bilateral 2005    ingrown toenail ablation       Current Outpatient Medications   Medication Sig Dispense Refill    naproxen sodium (ANAPROX) 220 MG tablet Take by mouth 3 times daily (with meals) (Patient not taking: Reported on 3/6/2024)       No current facility-administered medications for this visit.     Allergies: Bupropion     Tobacco History:  reports that she has been smoking cigarettes. She has never used smokeless tobacco.  Alcohol Abuse:  reports no history of alcohol use.  Drug Abuse:  reports no history of drug use.    Family Medical/Cancer History:   Family History   Problem Relation Age of Onset    Nural Tube Defect Child     
Anais Callaway is a 48 y.o. female returns for an annual exam     No chief complaint on file.      No LMP recorded.  Her periods are absent in flow   Problems: problems - prolapse  Birth Control: ablation.  Last Pap: normal obtained in 7/5/2018 year(s) ago.  She does not have a history of DINA 2, 3 or cervical cancer.   Last Mammogram: has not had a recent mammogram.            1. Have you been to the ER, urgent care clinic, or hospitalized since your last visit? No    2. Have you seen or consulted any other health care providers outside of the [facility] since your last visit? No    Examination chaperoned by Nakul Brennan LPN.  
orbital region/eye/Right:

## 2024-03-15 ENCOUNTER — HOSPITAL ENCOUNTER (OUTPATIENT)
Facility: HOSPITAL | Age: 49
End: 2024-03-15
Payer: MEDICAID

## 2024-03-15 VITALS — HEIGHT: 70 IN | BODY MASS INDEX: 41.52 KG/M2 | WEIGHT: 290 LBS

## 2024-03-15 DIAGNOSIS — Z12.31 VISIT FOR SCREENING MAMMOGRAM: ICD-10-CM

## 2024-03-15 PROCEDURE — 77067 SCR MAMMO BI INCL CAD: CPT

## 2024-03-16 LAB
CYTOLOGIST CVX/VAG CYTO: NORMAL
CYTOLOGY CVX/VAG DOC CYTO: NORMAL
CYTOLOGY CVX/VAG DOC THIN PREP: NORMAL
DX ICD CODE: NORMAL
HPV GENOTYPE REFLEX: NORMAL
HPV I/H RISK 4 DNA CVX QL PROBE+SIG AMP: NEGATIVE
Lab: NORMAL
OTHER STN SPEC: NORMAL
STAT OF ADQ CVX/VAG CYTO-IMP: NORMAL

## (undated) DEVICE — Device

## (undated) DEVICE — DRAPE,REIN 53X77,STERILE: Brand: MEDLINE

## (undated) DEVICE — (D)PREP SKN CHLRAPRP APPL 26ML -- CONVERT TO ITEM 371833

## (undated) DEVICE — TROCAR: Brand: KII® SLEEVE

## (undated) DEVICE — COVER LT HNDL PLAS RIG 1 PER PK

## (undated) DEVICE — INFECTION CONTROL KIT SYS

## (undated) DEVICE — APPLIER CLP M L L11.4IN DIA10MM ENDOSCP ROT MULT FOR LIG

## (undated) DEVICE — TISSUE RETRIEVAL SYSTEM: Brand: INZII RETRIEVAL SYSTEM

## (undated) DEVICE — 3M™ TEGADERM™ TRANSPARENT FILM DRESSING FRAME STYLE, 1624W, 2-3/8 IN X 2-3/4 IN (6 CM X 7 CM), 100/CT 4CT/CASE: Brand: 3M™ TEGADERM™

## (undated) DEVICE — SURGICAL PROCEDURE KIT GEN LAPAROSCOPY LF

## (undated) DEVICE — NEEDLE HYPO 22GA L1.5IN BLK S STL HUB POLYPR SHLD REG BVL

## (undated) DEVICE — REM POLYHESIVE ADULT PATIENT RETURN ELECTRODE: Brand: VALLEYLAB

## (undated) DEVICE — SUTURE MCRYL SZ 4-0 L27IN ABSRB UD L19MM PS-2 1/2 CIR PRIM Y426H

## (undated) DEVICE — TROCAR: Brand: KII® OPTICAL ACCESS SYSTEM

## (undated) DEVICE — CLICKLINE SCISSORS INSERT: Brand: CLICKLINE

## (undated) DEVICE — STERILE POLYISOPRENE POWDER-FREE SURGICAL GLOVES: Brand: PROTEXIS

## (undated) DEVICE — STRAP,POSITIONING,KNEE/BODY,FOAM,4X60": Brand: MEDLINE

## (undated) DEVICE — LAPAROSCOPIC TROCAR SLEEVE/SINGLE USE: Brand: KII® OPTICAL ACCESS SYSTEM

## (undated) DEVICE — SYR 10ML LUER LOK 1/5ML GRAD --

## (undated) DEVICE — STRIP,CLOSURE,WOUND,MEDI-STRIP,1/2X4: Brand: MEDLINE

## (undated) DEVICE — TUBING INSUF 0.3UM FLTR W/ LUERLOCK CONN

## (undated) DEVICE — TOWEL SURG W17XL27IN STD BLU COT NONFENESTRATED PREWASHED

## (undated) DEVICE — PAD,NON-ADHERENT,3X8,STERILE,LF,1/PK: Brand: MEDLINE

## (undated) DEVICE — DEVICE TRNSF SPIK STL 2008S] MICROTEK MEDICAL INC]